# Patient Record
Sex: FEMALE | Race: WHITE | Employment: FULL TIME | URBAN - METROPOLITAN AREA
[De-identification: names, ages, dates, MRNs, and addresses within clinical notes are randomized per-mention and may not be internally consistent; named-entity substitution may affect disease eponyms.]

---

## 2024-08-30 ENCOUNTER — APPOINTMENT (OUTPATIENT)
Age: 49
End: 2024-08-30

## 2024-08-30 ENCOUNTER — HOSPITAL ENCOUNTER (EMERGENCY)
Age: 49
Discharge: HOME | End: 2024-08-30
Attending: EMERGENCY MEDICINE

## 2024-08-30 VITALS
DIASTOLIC BLOOD PRESSURE: 87 MMHG | RESPIRATION RATE: 17 BRPM | BODY MASS INDEX: 36.82 KG/M2 | TEMPERATURE: 97.8 F | OXYGEN SATURATION: 100 % | WEIGHT: 195 LBS | HEART RATE: 68 BPM | SYSTOLIC BLOOD PRESSURE: 128 MMHG | HEIGHT: 61 IN

## 2024-08-30 DIAGNOSIS — K76.89 LIVER CYST: ICD-10-CM

## 2024-08-30 DIAGNOSIS — R94.31 PROLONGED QT INTERVAL: ICD-10-CM

## 2024-08-30 DIAGNOSIS — K57.90 DIVERTICULOSIS: ICD-10-CM

## 2024-08-30 DIAGNOSIS — K52.9 ACUTE GASTROENTERITIS: Primary | ICD-10-CM

## 2024-08-30 LAB
ANION GAP SERPL CALCULATED.3IONS-SCNC: 16 MMOL/L (ref 3–16)
BASOPHILS # BLD: 0.02 K/UL (ref 0–0.2)
BASOPHILS NFR BLD: 0 %
BILIRUB UR QL STRIP: ABNORMAL
BUN SERPL-MCNC: 12 MG/DL (ref 7–20)
CALCIUM SERPL-MCNC: 10.4 MG/DL (ref 8.3–10.6)
CHARACTER UR: ABNORMAL
CHLORIDE SERPL-SCNC: 104 MMOL/L (ref 99–110)
CLARITY UR: CLEAR
CO2 SERPL-SCNC: 24 MMOL/L (ref 21–32)
COLOR UR: YELLOW
CREAT SERPL-MCNC: 0.7 MG/DL (ref 0.5–1)
EKG ATRIAL RATE: 73 BPM
EKG DIAGNOSIS: NORMAL
EKG P AXIS: 52 DEGREES
EKG P-R INTERVAL: 164 MS
EKG Q-T INTERVAL: 442 MS
EKG QRS DURATION: 72 MS
EKG QTC CALCULATION (BAZETT): 486 MS
EKG R AXIS: 49 DEGREES
EKG T AXIS: 27 DEGREES
EKG VENTRICULAR RATE: 73 BPM
EOSINOPHIL # BLD: 0.08 K/UL (ref 0–0.6)
EOSINOPHILS RELATIVE PERCENT: 1 %
EPI CELLS #/AREA URNS HPF: ABNORMAL /HPF
ERYTHROCYTE [DISTWIDTH] IN BLOOD BY AUTOMATED COUNT: 12.6 % (ref 12.4–15.4)
GFR, ESTIMATED: >90 ML/MIN/1.73M2
GLUCOSE SERPL-MCNC: 152 MG/DL (ref 70–99)
GLUCOSE UR STRIP-MCNC: NEGATIVE MG/DL
HCG UR QL: NEGATIVE
HCT VFR BLD AUTO: 48.4 % (ref 36–48)
HGB BLD-MCNC: 17.1 G/DL (ref 12–16)
HGB UR QL STRIP.AUTO: NEGATIVE
IMM GRANULOCYTES # BLD AUTO: 0.01 K/UL (ref 0–0.5)
IMM GRANULOCYTES NFR BLD: 0 %
KETONES UR STRIP-MCNC: >80 MG/DL
LEUKOCYTE ESTERASE UR QL STRIP: ABNORMAL
LIPASE SERPL-CCNC: 29 U/L (ref 13–60)
LYMPHOCYTES NFR BLD: 1.57 K/UL (ref 1–5.1)
LYMPHOCYTES RELATIVE PERCENT: 18 %
MCH RBC QN AUTO: 31.1 PG (ref 26–34)
MCHC RBC AUTO-ENTMCNC: 35.3 G/DL (ref 31–36)
MCV RBC AUTO: 88 FL (ref 80–100)
MONOCYTES NFR BLD: 0.42 K/UL (ref 0–1.3)
MONOCYTES NFR BLD: 5 %
NEUTROPHILS NFR BLD: 77 %
NEUTS SEG NFR BLD: 6.8 K/UL (ref 1.7–7.7)
NITRITE UR QL STRIP: NEGATIVE
PH UR STRIP: 6.5 [PH] (ref 5–8)
PLATELET # BLD AUTO: 298 K/UL (ref 135–450)
PMV BLD AUTO: 10.1 FL
POTASSIUM SERPL-SCNC: 4.5 MMOL/L (ref 3.5–5.1)
PROT UR STRIP-MCNC: 100 MG/DL
RBC # BLD AUTO: 5.5 M/UL (ref 4–5.2)
RBC #/AREA URNS HPF: ABNORMAL /HPF
SODIUM SERPL-SCNC: 144 MMOL/L (ref 136–145)
SP GR UR STRIP: 1.01 (ref 1–1.03)
UROBILINOGEN UR STRIP-ACNC: 0.2 EU/DL (ref 0–1)
WBC #/AREA URNS HPF: ABNORMAL /HPF
WBC OTHER # BLD: 8.9 K/UL (ref 4–11)

## 2024-08-30 PROCEDURE — 99285 EMERGENCY DEPT VISIT HI MDM: CPT

## 2024-08-30 PROCEDURE — 84703 CHORIONIC GONADOTROPIN ASSAY: CPT

## 2024-08-30 PROCEDURE — 6360000004 HC RX CONTRAST MEDICATION: Performed by: EMERGENCY MEDICINE

## 2024-08-30 PROCEDURE — 74177 CT ABD & PELVIS W/CONTRAST: CPT

## 2024-08-30 PROCEDURE — 83690 ASSAY OF LIPASE: CPT

## 2024-08-30 PROCEDURE — 96375 TX/PRO/DX INJ NEW DRUG ADDON: CPT

## 2024-08-30 PROCEDURE — 96374 THER/PROPH/DIAG INJ IV PUSH: CPT

## 2024-08-30 PROCEDURE — 80048 BASIC METABOLIC PNL TOTAL CA: CPT

## 2024-08-30 PROCEDURE — 81001 URINALYSIS AUTO W/SCOPE: CPT

## 2024-08-30 PROCEDURE — 85025 COMPLETE CBC W/AUTO DIFF WBC: CPT

## 2024-08-30 PROCEDURE — 2580000003 HC RX 258: Performed by: EMERGENCY MEDICINE

## 2024-08-30 PROCEDURE — 6360000002 HC RX W HCPCS: Performed by: EMERGENCY MEDICINE

## 2024-08-30 RX ORDER — PROMETHAZINE HYDROCHLORIDE 25 MG/1
25 TABLET ORAL 4 TIMES DAILY PRN
Qty: 20 TABLET | Refills: 0 | Status: SHIPPED | OUTPATIENT
Start: 2024-08-30 | End: 2024-09-06

## 2024-08-30 RX ORDER — IOPAMIDOL 755 MG/ML
75 INJECTION, SOLUTION INTRAVASCULAR
Status: COMPLETED | OUTPATIENT
Start: 2024-08-30 | End: 2024-08-30

## 2024-08-30 RX ORDER — DICYCLOMINE HCL 20 MG
20 TABLET ORAL 4 TIMES DAILY
Qty: 40 TABLET | Refills: 0 | Status: SHIPPED | OUTPATIENT
Start: 2024-08-30 | End: 2024-09-09

## 2024-08-30 RX ORDER — PROCHLORPERAZINE EDISYLATE 5 MG/ML
10 INJECTION INTRAMUSCULAR; INTRAVENOUS ONCE
Status: COMPLETED | OUTPATIENT
Start: 2024-08-30 | End: 2024-08-30

## 2024-08-30 RX ORDER — 0.9 % SODIUM CHLORIDE 0.9 %
1000 INTRAVENOUS SOLUTION INTRAVENOUS ONCE
Status: COMPLETED | OUTPATIENT
Start: 2024-08-30 | End: 2024-08-30

## 2024-08-30 RX ORDER — ONDANSETRON 2 MG/ML
4 INJECTION INTRAMUSCULAR; INTRAVENOUS ONCE
Status: COMPLETED | OUTPATIENT
Start: 2024-08-30 | End: 2024-08-30

## 2024-08-30 RX ORDER — MORPHINE SULFATE 4 MG/ML
4 INJECTION, SOLUTION INTRAMUSCULAR; INTRAVENOUS
Status: COMPLETED | OUTPATIENT
Start: 2024-08-30 | End: 2024-08-30

## 2024-08-30 RX ADMIN — ONDANSETRON 4 MG: 2 INJECTION INTRAMUSCULAR; INTRAVENOUS at 11:12

## 2024-08-30 RX ADMIN — SODIUM CHLORIDE 1000 ML: 9 INJECTION, SOLUTION INTRAVENOUS at 10:12

## 2024-08-30 RX ADMIN — MORPHINE SULFATE 4 MG: 4 INJECTION, SOLUTION INTRAMUSCULAR; INTRAVENOUS at 10:55

## 2024-08-30 RX ADMIN — PROCHLORPERAZINE EDISYLATE 10 MG: 5 INJECTION INTRAMUSCULAR; INTRAVENOUS at 10:13

## 2024-08-30 RX ADMIN — IOPAMIDOL 75 ML: 755 INJECTION, SOLUTION INTRAVENOUS at 11:25

## 2024-08-30 ASSESSMENT — PAIN DESCRIPTION - DESCRIPTORS
DESCRIPTORS: DULL
DESCRIPTORS: DULL

## 2024-08-30 ASSESSMENT — PAIN - FUNCTIONAL ASSESSMENT: PAIN_FUNCTIONAL_ASSESSMENT: 0-10

## 2024-08-30 ASSESSMENT — PAIN SCALES - GENERAL
PAINLEVEL_OUTOF10: 5
PAINLEVEL_OUTOF10: 7

## 2024-08-30 ASSESSMENT — PAIN DESCRIPTION - LOCATION
LOCATION: ABDOMEN
LOCATION: ABDOMEN

## 2024-08-30 NOTE — ED NOTES
Patient states she has\"slight\" abdominal pain but is rolling around in bed and unable to sit still and is moaning.

## 2024-08-30 NOTE — ED PROVIDER NOTES
[facility] EMERGENCY DEPT     EMERGENCY DEPARTMENT ENCOUNTER            Pt Name: Yamilka Veliz   MRN: [id number]   Birthdate 1975   Date of evaluation: 8/30/2024   Provider: Reece Maza MD   PCP: No primary care provider on file.   Note Started: 10:57 AM EDT 8/30/24          CHIEF COMPLAINT     Chief Complaint   Patient presents with    Emesis     Woke up at 0430 with nausea and vomiting. Slight lower abdominal pain.              HISTORY OF PRESENT ILLNESS:   History from : Patient   Limitations to history : None     Yamilka Veliz is a 49 y.o. female who presents to the emergency department with nausea vomiting and diarrhea beginning at 4:30 AM today.  She has had too numerous to count episodes of vomiting and states over the past few days she has actually had loose stool.  She denies fever, chills but does complain of malaise and fatigue.  She reports no bleeding of any sort, no dysuria, no chest pain, shortness of breath.  She is taken no temporizing measures to treat her condition.    Nursing Notes were all reviewed and agreed with, or any disagreements were addressed in the HPI.     REVIEW OF SYSTEMS :    Positives and Pertinent negatives as per HPI.      MEDICAL HISTORY   has a past medical history of Arthritis, Cancer (HCC), and Thyroid disease.    Past Surgical History:   Procedure Laterality Date    BUNIONECTOMY  12/06/2012    left    CARPAL TUNNEL RELEASE  2007    bilateral    OVARY REMOVAL        CURRENTMEDICATIONS       Previous Medications    CALCIUM CARBONATE (OSCAL) 500 MG TABS TABLET    Take 500 mg by mouth daily.      LEVOTHYROXINE (SYNTHROID) 200 MCG TABLET    Take 200 mcg by mouth daily.      MULTIVITAMIN (THERAGRAN) PER TABLET    Take 1 tablet by mouth daily.        SCREENINGS          Jackpot Coma Scale  Eye Opening: Spontaneous  Best Verbal Response: Oriented  Best Motor Response: Obeys commands  Shira Coma Scale Score: 15                CIWA Assessment  BP: (!)  IntraVENous Given 8/30/24 1125)        CONSULTS:   None   Discussion with Other Professionals: None   Social Determinants: None   Chronic Conditions:  None    Records Reviewed: Epic EMR      Disposition Considerations: Home self-care  I am the Primary Clinician of Record.        FINAL IMPRESSION    1. Acute gastroenteritis    2. Prolonged QT interval    3. Diverticulosis    4. Liver cyst           DISPOSITION/PLAN:     Clear liquid diet 1 to 2 days and advance diet as tolerated  Fill prescriptions and take as written  Contact primary care to establish a relationship and obtain follow-up  You must follow-up for the liver cyst discovered today.        Pt discharged home in stable condition.     PATIENT REFERRED TO:   Jenn Frederick MD  [address] [facility] [place] [address]  [phone]    Call in 1 day         DISCHARGE MEDICATIONS:   New Prescriptions    DICYCLOMINE (BENTYL) 20 MG TABLET    Take 1 tablet by mouth 4 times daily for 10 days    PROMETHAZINE (PHENERGAN) 25 MG TABLET    Take 1 tablet by mouth 4 times daily as needed for Nausea        DISCONTINUED MEDICATIONS:   Discontinued Medications    No medications on file              (Please note that portions of this note were completed with a voice recognition program.  Efforts were made to edit the dictations but occasionally words are mis-transcribed.)       Reece Maza MD (electronically signed)              Reece Maza MD  08/30/24 8417

## 2024-08-30 NOTE — DISCHARGE INSTRUCTIONS
Your testing today showed the presence of diverticulosis and a liver cyst.  Please get with your primary care provider as you need to have follow-up on both of these conditions especially the liver cyst as soon as possible.  Since she did not identify a primary care physician I am placing the name of a doctor located in the area you may contact to establish a relationship with for primary medical care.

## 2025-07-17 ENCOUNTER — APPOINTMENT (OUTPATIENT)
Age: 50
End: 2025-07-17
Payer: MEDICAID

## 2025-07-17 ENCOUNTER — HOSPITAL ENCOUNTER (INPATIENT)
Age: 50
LOS: 2 days | Discharge: HOME | End: 2025-07-19
Attending: EMERGENCY MEDICINE | Admitting: FAMILY MEDICINE
Payer: MEDICAID

## 2025-07-17 DIAGNOSIS — R10.84 GENERALIZED ABDOMINAL PAIN: ICD-10-CM

## 2025-07-17 DIAGNOSIS — K57.32 DIVERTICULITIS LARGE INTESTINE W/O PERFORATION OR ABSCESS W/O BLEEDING: ICD-10-CM

## 2025-07-17 DIAGNOSIS — R11.2 NAUSEA AND VOMITING, UNSPECIFIED VOMITING TYPE: Primary | ICD-10-CM

## 2025-07-17 DIAGNOSIS — N83.201 RIGHT OVARIAN CYST: ICD-10-CM

## 2025-07-17 DIAGNOSIS — Z85.43 HISTORY OF OVARIAN CANCER: ICD-10-CM

## 2025-07-17 DIAGNOSIS — R19.00 INTRAABDOMINAL MASS: ICD-10-CM

## 2025-07-17 DIAGNOSIS — K57.92 ACUTE DIVERTICULITIS: ICD-10-CM

## 2025-07-17 LAB
ALBUMIN SERPL-MCNC: 5.4 G/DL (ref 3.4–5)
ALBUMIN/GLOB SERPL: 1.6 {RATIO}
ALP SERPL-CCNC: 63 U/L (ref 40–129)
ALT SERPL-CCNC: 43 U/L (ref 10–40)
ANION GAP SERPL CALCULATED.3IONS-SCNC: 17 MMOL/L (ref 3–16)
AST SERPL-CCNC: 31 U/L (ref 15–37)
BACTERIA URNS QL MICRO: ABNORMAL
BASOPHILS # BLD: 0.02 K/UL (ref 0–0.2)
BASOPHILS NFR BLD: 0 %
BILIRUB SERPL-MCNC: 0.7 MG/DL (ref 0–1)
BILIRUB UR QL STRIP: NEGATIVE
BUN SERPL-MCNC: 11 MG/DL (ref 7–20)
CALCIUM SERPL-MCNC: 10.3 MG/DL (ref 8.3–10.6)
CHARACTER UR: ABNORMAL
CHLORIDE SERPL-SCNC: 101 MMOL/L (ref 99–110)
CLARITY UR: CLEAR
CO2 SERPL-SCNC: 22 MMOL/L (ref 21–32)
COLOR UR: YELLOW
CREAT SERPL-MCNC: 0.8 MG/DL (ref 0.5–1)
EOSINOPHIL # BLD: 0.14 K/UL (ref 0–0.6)
EOSINOPHILS RELATIVE PERCENT: 1 %
EPI CELLS #/AREA URNS HPF: ABNORMAL /HPF
ERYTHROCYTE [DISTWIDTH] IN BLOOD BY AUTOMATED COUNT: 13 % (ref 12.4–15.4)
GFR, ESTIMATED: 84 ML/MIN/1.73M2
GLUCOSE SERPL-MCNC: 123 MG/DL (ref 70–99)
GLUCOSE UR STRIP-MCNC: NEGATIVE MG/DL
HCG UR QL: NEGATIVE
HCT VFR BLD AUTO: 52.8 % (ref 36–48)
HGB BLD-MCNC: 17.8 G/DL (ref 12–16)
HGB UR QL STRIP.AUTO: ABNORMAL
IMM GRANULOCYTES # BLD AUTO: 0.04 K/UL (ref 0–0.5)
IMM GRANULOCYTES NFR BLD: 0 %
KETONES UR STRIP-MCNC: 40 MG/DL
LEUKOCYTE ESTERASE UR QL STRIP: NEGATIVE
LIPASE SERPL-CCNC: 45 U/L (ref 13–60)
LYMPHOCYTES NFR BLD: 1.77 K/UL (ref 1–5.1)
LYMPHOCYTES RELATIVE PERCENT: 15 %
MCH RBC QN AUTO: 29.4 PG (ref 26–34)
MCHC RBC AUTO-ENTMCNC: 33.7 G/DL (ref 31–36)
MCV RBC AUTO: 87.1 FL (ref 80–100)
MONOCYTES NFR BLD: 0.54 K/UL (ref 0–1.3)
MONOCYTES NFR BLD: 5 %
MUCOUS THREADS URNS QL MICRO: PRESENT
NEUTROPHILS NFR BLD: 78 %
NEUTS SEG NFR BLD: 9.09 K/UL (ref 1.7–7.7)
NITRITE UR QL STRIP: NEGATIVE
PH UR STRIP: 6 [PH] (ref 5–8)
PLATELET # BLD AUTO: 317 K/UL (ref 135–450)
PMV BLD AUTO: 10.4 FL
POTASSIUM SERPL-SCNC: 4.2 MMOL/L (ref 3.5–5.1)
PROT SERPL-MCNC: 8.8 G/DL (ref 6.4–8.2)
PROT UR STRIP-MCNC: 30 MG/DL
RBC # BLD AUTO: 6.06 M/UL (ref 4–5.2)
RBC #/AREA URNS HPF: ABNORMAL /HPF
SODIUM SERPL-SCNC: 140 MMOL/L (ref 136–145)
SP GR UR STRIP: 1.02 (ref 1–1.03)
UROBILINOGEN UR STRIP-ACNC: 0.2 EU/DL (ref 0–1)
WBC #/AREA URNS HPF: ABNORMAL /HPF
WBC OTHER # BLD: 11.6 K/UL (ref 4–11)

## 2025-07-17 PROCEDURE — 81001 URINALYSIS AUTO W/SCOPE: CPT

## 2025-07-17 PROCEDURE — 6360000002 HC RX W HCPCS: Performed by: EMERGENCY MEDICINE

## 2025-07-17 PROCEDURE — 96375 TX/PRO/DX INJ NEW DRUG ADDON: CPT

## 2025-07-17 PROCEDURE — 6360000004 HC RX CONTRAST MEDICATION: Performed by: EMERGENCY MEDICINE

## 2025-07-17 PROCEDURE — 99285 EMERGENCY DEPT VISIT HI MDM: CPT

## 2025-07-17 PROCEDURE — 2580000003 HC RX 258: Performed by: FAMILY MEDICINE

## 2025-07-17 PROCEDURE — 96376 TX/PRO/DX INJ SAME DRUG ADON: CPT

## 2025-07-17 PROCEDURE — 96374 THER/PROPH/DIAG INJ IV PUSH: CPT

## 2025-07-17 PROCEDURE — 80053 COMPREHEN METABOLIC PANEL: CPT

## 2025-07-17 PROCEDURE — 84703 CHORIONIC GONADOTROPIN ASSAY: CPT

## 2025-07-17 PROCEDURE — 74177 CT ABD & PELVIS W/CONTRAST: CPT

## 2025-07-17 PROCEDURE — 83690 ASSAY OF LIPASE: CPT

## 2025-07-17 PROCEDURE — 6360000002 HC RX W HCPCS: Performed by: FAMILY MEDICINE

## 2025-07-17 PROCEDURE — 85025 COMPLETE CBC W/AUTO DIFF WBC: CPT

## 2025-07-17 PROCEDURE — 1200000000 HC SEMI PRIVATE

## 2025-07-17 PROCEDURE — 2580000003 HC RX 258: Performed by: EMERGENCY MEDICINE

## 2025-07-17 RX ORDER — 0.9 % SODIUM CHLORIDE 0.9 %
500 INTRAVENOUS SOLUTION INTRAVENOUS ONCE
Status: COMPLETED | OUTPATIENT
Start: 2025-07-17 | End: 2025-07-17

## 2025-07-17 RX ORDER — POLYETHYLENE GLYCOL 3350 17 G/17G
17 POWDER, FOR SOLUTION ORAL DAILY PRN
Status: DISCONTINUED | OUTPATIENT
Start: 2025-07-17 | End: 2025-07-19 | Stop reason: HOSPADM

## 2025-07-17 RX ORDER — MAGNESIUM SULFATE HEPTAHYDRATE 40 MG/ML
2000 INJECTION, SOLUTION INTRAVENOUS PRN
Status: DISCONTINUED | OUTPATIENT
Start: 2025-07-17 | End: 2025-07-19 | Stop reason: HOSPADM

## 2025-07-17 RX ORDER — ACETAMINOPHEN 650 MG/1
650 SUPPOSITORY RECTAL EVERY 6 HOURS PRN
Status: DISCONTINUED | OUTPATIENT
Start: 2025-07-17 | End: 2025-07-19 | Stop reason: HOSPADM

## 2025-07-17 RX ORDER — MORPHINE SULFATE 4 MG/ML
4 INJECTION, SOLUTION INTRAMUSCULAR; INTRAVENOUS ONCE
Status: COMPLETED | OUTPATIENT
Start: 2025-07-17 | End: 2025-07-17

## 2025-07-17 RX ORDER — LEVOTHYROXINE SODIUM 100 UG/1
200 TABLET ORAL DAILY
Status: DISCONTINUED | OUTPATIENT
Start: 2025-07-18 | End: 2025-07-17 | Stop reason: DRUGHIGH

## 2025-07-17 RX ORDER — POTASSIUM CHLORIDE 1500 MG/1
40 TABLET, EXTENDED RELEASE ORAL PRN
Status: DISCONTINUED | OUTPATIENT
Start: 2025-07-17 | End: 2025-07-19 | Stop reason: HOSPADM

## 2025-07-17 RX ORDER — SODIUM CHLORIDE 9 MG/ML
INJECTION, SOLUTION INTRAVENOUS PRN
Status: DISCONTINUED | OUTPATIENT
Start: 2025-07-17 | End: 2025-07-19 | Stop reason: HOSPADM

## 2025-07-17 RX ORDER — METRONIDAZOLE 500 MG/100ML
500 INJECTION, SOLUTION INTRAVENOUS EVERY 8 HOURS
Status: DISCONTINUED | OUTPATIENT
Start: 2025-07-18 | End: 2025-07-19 | Stop reason: HOSPADM

## 2025-07-17 RX ORDER — IOPAMIDOL 755 MG/ML
75 INJECTION, SOLUTION INTRAVASCULAR
Status: COMPLETED | OUTPATIENT
Start: 2025-07-17 | End: 2025-07-17

## 2025-07-17 RX ORDER — POTASSIUM CHLORIDE 7.45 MG/ML
10 INJECTION INTRAVENOUS PRN
Status: DISCONTINUED | OUTPATIENT
Start: 2025-07-17 | End: 2025-07-19 | Stop reason: HOSPADM

## 2025-07-17 RX ORDER — SODIUM CHLORIDE 0.9 % (FLUSH) 0.9 %
5-40 SYRINGE (ML) INJECTION EVERY 12 HOURS SCHEDULED
Status: DISCONTINUED | OUTPATIENT
Start: 2025-07-17 | End: 2025-07-19 | Stop reason: HOSPADM

## 2025-07-17 RX ORDER — LEVOFLOXACIN 5 MG/ML
500 INJECTION, SOLUTION INTRAVENOUS ONCE
Status: COMPLETED | OUTPATIENT
Start: 2025-07-17 | End: 2025-07-17

## 2025-07-17 RX ORDER — ACETAMINOPHEN 325 MG/1
650 TABLET ORAL EVERY 6 HOURS PRN
Status: DISCONTINUED | OUTPATIENT
Start: 2025-07-17 | End: 2025-07-19 | Stop reason: HOSPADM

## 2025-07-17 RX ORDER — SODIUM CHLORIDE 9 MG/ML
INJECTION, SOLUTION INTRAVENOUS CONTINUOUS
Status: ACTIVE | OUTPATIENT
Start: 2025-07-17 | End: 2025-07-18

## 2025-07-17 RX ORDER — DIPHENHYDRAMINE HYDROCHLORIDE 50 MG/ML
25 INJECTION, SOLUTION INTRAMUSCULAR; INTRAVENOUS ONCE
Status: COMPLETED | OUTPATIENT
Start: 2025-07-17 | End: 2025-07-17

## 2025-07-17 RX ORDER — METRONIDAZOLE 500 MG/100ML
500 INJECTION, SOLUTION INTRAVENOUS ONCE
Status: COMPLETED | OUTPATIENT
Start: 2025-07-17 | End: 2025-07-17

## 2025-07-17 RX ORDER — SODIUM CHLORIDE 0.9 % (FLUSH) 0.9 %
5-40 SYRINGE (ML) INJECTION PRN
Status: DISCONTINUED | OUTPATIENT
Start: 2025-07-17 | End: 2025-07-19 | Stop reason: HOSPADM

## 2025-07-17 RX ORDER — ONDANSETRON 2 MG/ML
4 INJECTION INTRAMUSCULAR; INTRAVENOUS ONCE
Status: COMPLETED | OUTPATIENT
Start: 2025-07-17 | End: 2025-07-17

## 2025-07-17 RX ORDER — 0.9 % SODIUM CHLORIDE 0.9 %
1000 INTRAVENOUS SOLUTION INTRAVENOUS ONCE
Status: COMPLETED | OUTPATIENT
Start: 2025-07-17 | End: 2025-07-17

## 2025-07-17 RX ORDER — METOCLOPRAMIDE HYDROCHLORIDE 5 MG/ML
5 INJECTION INTRAMUSCULAR; INTRAVENOUS ONCE
Status: COMPLETED | OUTPATIENT
Start: 2025-07-17 | End: 2025-07-17

## 2025-07-17 RX ORDER — KETOROLAC TROMETHAMINE 15 MG/ML
15 INJECTION, SOLUTION INTRAMUSCULAR; INTRAVENOUS ONCE
Status: COMPLETED | OUTPATIENT
Start: 2025-07-17 | End: 2025-07-17

## 2025-07-17 RX ORDER — PANTOPRAZOLE SODIUM 40 MG/10ML
40 INJECTION, POWDER, LYOPHILIZED, FOR SOLUTION INTRAVENOUS ONCE
Status: COMPLETED | OUTPATIENT
Start: 2025-07-17 | End: 2025-07-17

## 2025-07-17 RX ORDER — ONDANSETRON 2 MG/ML
4 INJECTION INTRAMUSCULAR; INTRAVENOUS EVERY 6 HOURS PRN
Status: DISCONTINUED | OUTPATIENT
Start: 2025-07-17 | End: 2025-07-19 | Stop reason: HOSPADM

## 2025-07-17 RX ORDER — ENOXAPARIN SODIUM 100 MG/ML
40 INJECTION SUBCUTANEOUS DAILY
Status: DISCONTINUED | OUTPATIENT
Start: 2025-07-17 | End: 2025-07-19 | Stop reason: HOSPADM

## 2025-07-17 RX ORDER — ONDANSETRON 4 MG/1
4 TABLET, ORALLY DISINTEGRATING ORAL EVERY 8 HOURS PRN
Status: DISCONTINUED | OUTPATIENT
Start: 2025-07-17 | End: 2025-07-19 | Stop reason: HOSPADM

## 2025-07-17 RX ORDER — LEVOFLOXACIN 5 MG/ML
750 INJECTION, SOLUTION INTRAVENOUS EVERY 24 HOURS
Status: DISCONTINUED | OUTPATIENT
Start: 2025-07-18 | End: 2025-07-19 | Stop reason: HOSPADM

## 2025-07-17 RX ADMIN — ONDANSETRON 4 MG: 2 INJECTION, SOLUTION INTRAMUSCULAR; INTRAVENOUS at 10:33

## 2025-07-17 RX ADMIN — ENOXAPARIN SODIUM 40 MG: 100 INJECTION SUBCUTANEOUS at 17:00

## 2025-07-17 RX ADMIN — PANTOPRAZOLE SODIUM 40 MG: 40 INJECTION, POWDER, FOR SOLUTION INTRAVENOUS at 12:23

## 2025-07-17 RX ADMIN — IOPAMIDOL 75 ML: 755 INJECTION, SOLUTION INTRAVENOUS at 12:38

## 2025-07-17 RX ADMIN — KETOROLAC TROMETHAMINE 15 MG: 15 INJECTION, SOLUTION INTRAMUSCULAR; INTRAVENOUS at 10:34

## 2025-07-17 RX ADMIN — METRONIDAZOLE 500 MG: 500 INJECTION, SOLUTION INTRAVENOUS at 16:58

## 2025-07-17 RX ADMIN — ONDANSETRON 4 MG: 2 INJECTION, SOLUTION INTRAMUSCULAR; INTRAVENOUS at 12:23

## 2025-07-17 RX ADMIN — SODIUM CHLORIDE 1000 ML: 0.9 INJECTION, SOLUTION INTRAVENOUS at 15:18

## 2025-07-17 RX ADMIN — LEVOFLOXACIN 500 MG: 500 INJECTION, SOLUTION INTRAVENOUS at 15:19

## 2025-07-17 RX ADMIN — METOCLOPRAMIDE 5 MG: 5 INJECTION, SOLUTION INTRAMUSCULAR; INTRAVENOUS at 10:35

## 2025-07-17 RX ADMIN — MORPHINE SULFATE 4 MG: 4 INJECTION, SOLUTION INTRAMUSCULAR; INTRAVENOUS at 12:22

## 2025-07-17 RX ADMIN — SODIUM CHLORIDE: 0.9 INJECTION, SOLUTION INTRAVENOUS at 16:58

## 2025-07-17 RX ADMIN — DIPHENHYDRAMINE HYDROCHLORIDE 25 MG: 50 INJECTION INTRAMUSCULAR; INTRAVENOUS at 10:34

## 2025-07-17 RX ADMIN — SODIUM CHLORIDE 500 ML: 0.9 INJECTION, SOLUTION INTRAVENOUS at 10:33

## 2025-07-17 ASSESSMENT — LIFESTYLE VARIABLES
HOW OFTEN DO YOU HAVE A DRINK CONTAINING ALCOHOL: NEVER
HOW MANY STANDARD DRINKS CONTAINING ALCOHOL DO YOU HAVE ON A TYPICAL DAY: PATIENT DOES NOT DRINK

## 2025-07-17 ASSESSMENT — PAIN SCALES - GENERAL
PAINLEVEL_OUTOF10: 7
PAINLEVEL_OUTOF10: 5
PAINLEVEL_OUTOF10: 0
PAINLEVEL_OUTOF10: 0
PAINLEVEL_OUTOF10: 6

## 2025-07-17 ASSESSMENT — PAIN - FUNCTIONAL ASSESSMENT: PAIN_FUNCTIONAL_ASSESSMENT: NONE - DENIES PAIN

## 2025-07-17 NOTE — ED NOTES
ED to Inpatient Handoff SBAR    Patient Name: Yamilka Veliz   :  1975  50 y.o.   MRN:  [id number]  Preferred Name  Yamilka  ED Room #:  10/10  Family/Caregiver Present yes     Chief Complaint Vomiting (X 3 hrs)       Restraints no   Sitter no   Sepsis Risk Score    Isolation No active isolations   Fall Risk Assessment Presents to emergency department  because of falls (Syncope, seizure, or loss of consciousness): No, Age > 70: No, Altered Mental Status, Intoxication with alcohol or substance confusion (Disorientation, impaired judgment, poor safety awaremess, or inability to follow instructions): No, Impaired Mobility: Ambulates or transfers with assistive devices or assistance; Unable to ambulate or transer.: No, Nursing Judgement: No     Situation  Code Status: No Order Limited Code details: Intubation/Re-intubation No Comment; Defibrillation/Cardioversion No Comment; Chest Compressions No Comment; Resuscitative Medications No Comment; Other No Comment.    Allergies: Cefdinir  Weight: Patient Vitals for the past 96 hrs (Last 3 readings):   Weight   25 1003 78.2 kg (172 lb 4.8 oz)     Arrived from: home  Hospital Problem/Diagnosis:  Principal Problem:    Diverticulitis large intestine w/o perforation or abscess w/o bleeding  Resolved Problems:    * No resolved hospital problems. *    Imaging:   CT ABDOMEN PELVIS W IV CONTRAST Additional Contrast? None   Final Result      Moderate colonic diverticulosis, with mild fat stranding along the transverse colon suspicious for acute uncomplicated diverticulitis.      3.5 x 2.0 cm anterior intraperitoneal mass, increased. This is indeterminate, however given history of ovarian malignancy, metastasis is not excluded. Recommend tissue sampling.      Previously described indeterminant hepatic low-density lesion is compatible with a benign hemangioma.      4 cm right adnexal region low-density lesion, likely ovarian cyst. Given size and history of ovarian malignancy,

## 2025-07-17 NOTE — PLAN OF CARE
Problem: Discharge Planning  Goal: Discharge to home or other facility with appropriate resources  Outcome: Progressing  Flowsheets  Taken 7/17/2025 1600  Discharge to home or other facility with appropriate resources:   Identify discharge learning needs (meds, wound care, etc)   Arrange for needed discharge resources and transportation as appropriate   Identify barriers to discharge with patient and caregiver  Taken 7/17/2025 1555  Discharge to home or other facility with appropriate resources:   Identify barriers to discharge with patient and caregiver   Arrange for needed discharge resources and transportation as appropriate   Identify discharge learning needs (meds, wound care, etc)     Problem: Pain  Goal: Verbalizes/displays adequate comfort level or baseline comfort level  Outcome: Progressing  Flowsheets (Taken 7/17/2025 1600)  Verbalizes/displays adequate comfort level or baseline comfort level:   Encourage patient to monitor pain and request assistance   Administer analgesics based on type and severity of pain and evaluate response   Implement non-pharmacological measures as appropriate and evaluate response   Assess pain using appropriate pain scale     Problem: Safety - Adult  Goal: Free from fall injury  Outcome: Progressing

## 2025-07-17 NOTE — PROGRESS NOTES
4 Eyes Skin Assessment     NAME:  Yamilka Veliz  YOB: 1975  MEDICAL RECORD NUMBER:  [id number]    The patient is being assessed for  Admission    I agree that at least one RN has performed a thorough Head to Toe Skin Assessment on the patient. ALL assessment sites listed below have been assessed.      Areas assessed by both nurses:    Head, Face, Ears, Shoulders, Back, Chest, Arms, Elbows, Hands, Sacrum. Buttock, Coccyx, Ischium, Legs. Feet and Heels, and Under Medical Devices         Does the Patient have a Wound? No noted wound(s)       Sergio Prevention initiated by RN: No  Wound Care Orders initiated by RN: No    For hospital-acquired stage 1 & 2 and ALL Stage 3,4, Unstageable, DTI, NWPT, and Complex wounds: place order “IP Wound Care/Ostomy Nurse Eval and Treat” by RN under : No    New Ostomies, if present place, Ostomy referral order under : No     Nurse 1 eSignature: Electronically signed by Jyothi Charlton RN on 7/17/25 at 4:30 PM EDT    **SHARE this note so that the co-signing nurse can place an eSignature**    Nurse 2 eSignature: Electronically signed by Kaylee Vallecillo RN on 7/17/25 at 4:31 PM EDT

## 2025-07-17 NOTE — H&P
V2.0  History and Physical      Name:  Yamilka Veliz /Age/Sex: 1975  (50 y.o. female)   MRN & CSN:  [id number] & [id number] Encounter Date/Time: 2025 3:32 PM EDT   Location:  10/10 PCP: Veto Maravilla MD       Hospital Day: 1    Assessment and Plan:   Yamilka Veliz is a 50 y.o. female with a pmh of hypothyroid who presents with Diverticulitis large intestine w/o perforation or abscess w/o bleeding    Hospital Problems           Last Modified POA    * (Principal) Diverticulitis large intestine w/o perforation or abscess w/o bleeding 2025 Yes       Plan:  Diverticulitis   Presented with abdominal pain, nausea and vomiting  CT scan shows moderate colonic diverticulosis with mild fat stranding along the transverse colon suspicious for acute uncomplicated diverticulitis  3.5 x 2.0 anterior intraperitoneal mass increased in size  Hepatic lesion compatible with benign hemangioma  4 cm right adnexal region low-density lesion likely ovarian cyst  Follow-up with pelvic ultrasound in 6 to 12 weeks  GI consult further recommendations  Levaquin 750 mg IV daily  Flagyl 500 mg every 8 hours  Pain control  IV fluid resuscitation      2.  Hypothyroidism  Synthroid 200 mcg daily        Will continue to follow, monitor and manage chronic medical conditions with medication listed below    Disposition:   Current Living situation: Home  Expected Disposition: Home   Estimated D/C: 1-2 days    Diet Diet NPO   DVT Prophylaxis [x] Lovenox, []  Heparin, [] SCDs, [x] Ambulation,  [] Eliquis, [] Xarelto, [] Coumadin   Code Status No Order   Surrogate Decision Maker/ POA self     Personally reviewed Lab Studies and Imaging           History from:     patient, spouse, ED physician, medical rec    History of Present Illness:     Chief Complaint: Abdominal pain, nausea and vomiting  Yamilka Veliz is a 50 y.o. female with pmh of hypothyroidism, ovarian cancer who presents with abdominal pain, nausea and vomiting     is at the

## 2025-07-17 NOTE — ED PROVIDER NOTES
Emergency Department Encounter    Patient: Yamilka Veliz  MRN: [id number]  : 1975  Date of Evaluation: 2025  ED Provider:  Roma James DO    Triage Chief Complaint:   Vomiting (X 3 hrs)    [place]:  Yamilka Veliz is a 50 y.o. female with history of thyroid disease, ovarian cancer, arthritis that presents to the emergency department complaining of abdominal pain nausea vomiting.  Patient states symptoms started this morning.  She states she has tried to take some morning medication but vomited.  Patient states she does have gluten allergy but unsure if she had any gluten.  She ate a pot roast last evening.  No other sick contacts.  Patient states no dysuria hematuria no fevers chills.  States intermittent cough.  Patient states she has had her left ovary removed in the past for ovarian cancer.  Patient here for evaluation    ROS - see HPI, below listed is current ROS at time of my eval:  10 systems reviewed and negative except as above.     Past Medical History:   Diagnosis Date    Arthritis     Cancer (HCC)     OVARIAN- LEFT    Thyroid disease      Past Surgical History:   Procedure Laterality Date    BUNIONECTOMY  2012    left    CARPAL TUNNEL RELEASE  2007    bilateral    OVARY REMOVAL       Family History   Problem Relation Age of Onset    Diabetes Mother     High Cholesterol Mother     High Blood Pressure Mother     Cancer Mother     Diabetes Father     High Cholesterol Father     High Blood Pressure Father      Social History     Socioeconomic History    Marital status:      Spouse name: Not on file    Number of children: Not on file    Years of education: Not on file    Highest education level: Not on file   Occupational History    Not on file   Tobacco Use    Smoking status: Never    Smokeless tobacco: Not on file   Substance and Sexual Activity    Alcohol use: Yes     Alcohol/week: 3.0 standard drinks of alcohol     Types: 3 Cans of beer per week    Drug use: No    Sexual

## 2025-07-17 NOTE — PROGRESS NOTES
Patient was admitted to the 4th floor room 4219. Vital signs and assessment are stable at the time of arrival. Patient was oriented to her room and call light. Skin check performed by two nurses. No complications at the time of arrival.

## 2025-07-17 NOTE — PROGRESS NOTES
4 Eyes Skin Assessment     NAME:  Yamilka Veliz  YOB: 1975  MEDICAL RECORD NUMBER:  [id number]    The patient is being assessed for  Admission    I agree that at least one RN has performed a thorough Head to Toe Skin Assessment on the patient. ALL assessment sites listed below have been assessed.      Areas assessed by both nurses:    Head, Face, Ears, Shoulders, Back, Chest, and Arms, Elbows, Hands        Does the Patient have a Wound? No noted wound(s)       Sergio Prevention initiated by RN: No  Wound Care Orders initiated by RN: No    For hospital-acquired stage 1 & 2 and ALL Stage 3,4, Unstageable, DTI, NWPT, and Complex wounds: place order “IP Wound Care/Ostomy Nurse Eval and Treat” by RN under : No    New Ostomies, if present place, Ostomy referral order under : No     Nurse 1 eSignature: Electronically signed by Jyothi Charlton RN on 7/17/25 at 4:28 PM EDT    **SHARE this note so that the co-signing nurse can place an eSignature**    Nurse 2 eSignature: Electronically signed by Jyothi Charlton RN on 7/17/25 at 4:29 PM EDT

## 2025-07-18 PROBLEM — R19.00 INTRAABDOMINAL MASS: Status: ACTIVE | Noted: 2025-07-18

## 2025-07-18 PROBLEM — Z85.43 HISTORY OF OVARIAN CANCER: Status: ACTIVE | Noted: 2025-07-18

## 2025-07-18 PROBLEM — N83.201 RIGHT OVARIAN CYST: Status: ACTIVE | Noted: 2025-07-18

## 2025-07-18 LAB
ALBUMIN SERPL-MCNC: 4 G/DL (ref 3.4–5)
ALBUMIN/GLOB SERPL: 1.9 {RATIO}
ALP SERPL-CCNC: 43 U/L (ref 40–129)
ALT SERPL-CCNC: 25 U/L (ref 10–40)
ANION GAP SERPL CALCULATED.3IONS-SCNC: 11 MMOL/L (ref 3–16)
AST SERPL-CCNC: 18 U/L (ref 15–37)
BASOPHILS # BLD: 0.01 K/UL (ref 0–0.2)
BASOPHILS NFR BLD: 0 %
BILIRUB SERPL-MCNC: 0.5 MG/DL (ref 0–1)
BUN SERPL-MCNC: 7 MG/DL (ref 7–20)
CALCIUM SERPL-MCNC: 8.6 MG/DL (ref 8.3–10.6)
CHLORIDE SERPL-SCNC: 106 MMOL/L (ref 99–110)
CO2 SERPL-SCNC: 20 MMOL/L (ref 21–32)
CREAT SERPL-MCNC: 0.6 MG/DL (ref 0.5–1)
EOSINOPHIL # BLD: 0.23 K/UL (ref 0–0.6)
EOSINOPHILS RELATIVE PERCENT: 2 %
ERYTHROCYTE [DISTWIDTH] IN BLOOD BY AUTOMATED COUNT: 13 % (ref 12.4–15.4)
GFR, ESTIMATED: >90 ML/MIN/1.73M2
GLUCOSE SERPL-MCNC: 96 MG/DL (ref 70–99)
HCT VFR BLD AUTO: 42.2 % (ref 36–48)
HGB BLD-MCNC: 14 G/DL (ref 12–16)
IMM GRANULOCYTES # BLD AUTO: 0.02 K/UL (ref 0–0.5)
IMM GRANULOCYTES NFR BLD: 0 %
LYMPHOCYTES NFR BLD: 3.05 K/UL (ref 1–5.1)
LYMPHOCYTES RELATIVE PERCENT: 29 %
MCH RBC QN AUTO: 29.6 PG (ref 26–34)
MCHC RBC AUTO-ENTMCNC: 33.2 G/DL (ref 31–36)
MCV RBC AUTO: 89.2 FL (ref 80–100)
MONOCYTES NFR BLD: 0.77 K/UL (ref 0–1.3)
MONOCYTES NFR BLD: 7 %
NEUTROPHILS NFR BLD: 61 %
NEUTS SEG NFR BLD: 6.44 K/UL (ref 1.7–7.7)
PLATELET # BLD AUTO: 212 K/UL (ref 135–450)
PMV BLD AUTO: 10.4 FL
POTASSIUM SERPL-SCNC: 3.8 MMOL/L (ref 3.5–5.1)
PROT SERPL-MCNC: 6.1 G/DL (ref 6.4–8.2)
RBC # BLD AUTO: 4.73 M/UL (ref 4–5.2)
SODIUM SERPL-SCNC: 137 MMOL/L (ref 136–145)
WBC OTHER # BLD: 10.5 K/UL (ref 4–11)

## 2025-07-18 PROCEDURE — 2580000003 HC RX 258: Performed by: FAMILY MEDICINE

## 2025-07-18 PROCEDURE — 80053 COMPREHEN METABOLIC PANEL: CPT

## 2025-07-18 PROCEDURE — 36415 COLL VENOUS BLD VENIPUNCTURE: CPT

## 2025-07-18 PROCEDURE — 1200000000 HC SEMI PRIVATE

## 2025-07-18 PROCEDURE — 6370000000 HC RX 637 (ALT 250 FOR IP): Performed by: FAMILY MEDICINE

## 2025-07-18 PROCEDURE — 6360000002 HC RX W HCPCS: Performed by: FAMILY MEDICINE

## 2025-07-18 PROCEDURE — 85025 COMPLETE CBC W/AUTO DIFF WBC: CPT

## 2025-07-18 PROCEDURE — 86304 IMMUNOASSAY TUMOR CA 125: CPT

## 2025-07-18 RX ADMIN — HYDROMORPHONE HYDROCHLORIDE 0.5 MG: 1 INJECTION, SOLUTION INTRAMUSCULAR; INTRAVENOUS; SUBCUTANEOUS at 01:12

## 2025-07-18 RX ADMIN — METRONIDAZOLE 500 MG: 500 INJECTION, SOLUTION INTRAVENOUS at 23:11

## 2025-07-18 RX ADMIN — HYDROMORPHONE HYDROCHLORIDE 0.5 MG: 1 INJECTION, SOLUTION INTRAMUSCULAR; INTRAVENOUS; SUBCUTANEOUS at 23:13

## 2025-07-18 RX ADMIN — METRONIDAZOLE 500 MG: 500 INJECTION, SOLUTION INTRAVENOUS at 17:11

## 2025-07-18 RX ADMIN — SODIUM CHLORIDE: 0.9 INJECTION, SOLUTION INTRAVENOUS at 06:37

## 2025-07-18 RX ADMIN — LEVOTHYROXINE SODIUM 175 MCG: 0.12 TABLET ORAL at 05:59

## 2025-07-18 RX ADMIN — ACETAMINOPHEN 650 MG: 325 TABLET ORAL at 06:04

## 2025-07-18 RX ADMIN — SODIUM CHLORIDE: 0.9 INJECTION, SOLUTION INTRAVENOUS at 01:10

## 2025-07-18 RX ADMIN — ACETAMINOPHEN 650 MG: 325 TABLET ORAL at 19:41

## 2025-07-18 RX ADMIN — ENOXAPARIN SODIUM 40 MG: 100 INJECTION SUBCUTANEOUS at 15:19

## 2025-07-18 RX ADMIN — LEVOFLOXACIN 750 MG: 5 INJECTION, SOLUTION INTRAVENOUS at 15:19

## 2025-07-18 RX ADMIN — METRONIDAZOLE 500 MG: 500 INJECTION, SOLUTION INTRAVENOUS at 01:12

## 2025-07-18 RX ADMIN — METRONIDAZOLE 500 MG: 500 INJECTION, SOLUTION INTRAVENOUS at 09:19

## 2025-07-18 ASSESSMENT — PAIN DESCRIPTION - DESCRIPTORS
DESCRIPTORS: ACHING;HEAVINESS
DESCRIPTORS: CRAMPING;ACHING
DESCRIPTORS: ACHING
DESCRIPTORS: ACHING

## 2025-07-18 ASSESSMENT — PAIN DESCRIPTION - LOCATION
LOCATION: GENERALIZED
LOCATION: HEAD
LOCATION: ABDOMEN
LOCATION: HEAD

## 2025-07-18 ASSESSMENT — PAIN SCALES - GENERAL
PAINLEVEL_OUTOF10: 5
PAINLEVEL_OUTOF10: 6
PAINLEVEL_OUTOF10: 7
PAINLEVEL_OUTOF10: 0
PAINLEVEL_OUTOF10: 4
PAINLEVEL_OUTOF10: 2
PAINLEVEL_OUTOF10: 5
PAINLEVEL_OUTOF10: 3
PAINLEVEL_OUTOF10: 6

## 2025-07-18 ASSESSMENT — PAIN DESCRIPTION - FREQUENCY
FREQUENCY: INTERMITTENT
FREQUENCY: CONTINUOUS

## 2025-07-18 ASSESSMENT — PAIN - FUNCTIONAL ASSESSMENT
PAIN_FUNCTIONAL_ASSESSMENT: ACTIVITIES ARE NOT PREVENTED
PAIN_FUNCTIONAL_ASSESSMENT: PREVENTS OR INTERFERES SOME ACTIVE ACTIVITIES AND ADLS

## 2025-07-18 ASSESSMENT — PAIN DESCRIPTION - ORIENTATION
ORIENTATION: RIGHT;LEFT
ORIENTATION: RIGHT;LEFT
ORIENTATION: MID
ORIENTATION: MID

## 2025-07-18 ASSESSMENT — PAIN SCALES - WONG BAKER
WONGBAKER_NUMERICALRESPONSE: NO HURT

## 2025-07-18 ASSESSMENT — PAIN DESCRIPTION - ONSET
ONSET: GRADUAL
ONSET: ON-GOING

## 2025-07-18 ASSESSMENT — PAIN DESCRIPTION - PAIN TYPE
TYPE: ACUTE PAIN
TYPE: ACUTE PAIN

## 2025-07-18 NOTE — CONSULTS
Consultation Note    Patient Name: Yamilka Veliz  : 1975  Age: 50 y.o.     Admitting Physician: Marcellus Smallwood MD   Date of Admission: 2025  9:57 AM   Primary Care Physician: Veto Maravilla MD        Yamilka Veliz is being seen at the request of Marcellus Smallwood MD for diverticulitis.    History of Present Illness:  Patient states that she has had vomiting for about 3 to 4 days then she started having diarrhea and so she came into the hospital.  There was fever and chills pain in the mid abdomen as well as in the right lower quadrant.  She denies any blood in the stool or black or tarry stool no blood in her emesis.  Her weight has been stable.  She states she has a history of gluten intolerance and was going to have an upper and lower endoscopy performed next week.    GI History:  As above    Past Medical History:  Past Medical History:   Diagnosis Date    Arthritis     Cancer (HCC)     OVARIAN- LEFT    Thyroid disease         Past Surgical History:  Past Surgical History:   Procedure Laterality Date    BUNIONECTOMY  2012    left    CARPAL TUNNEL RELEASE      bilateral    OVARY REMOVAL          Historical Medications:  Prior to Visit Medications    Medication Sig Taking? Authorizing Provider   levothyroxine (SYNTHROID) 200 MCG tablet Take 1 tablet by mouth daily Yes Fadumo Matson MD   dicyclomine (BENTYL) 20 MG tablet Take 1 tablet by mouth 4 times daily for 10 days  Reece Maza MD   calcium carbonate (OSCAL) 500 MG TABS tablet Take 500 mg by mouth daily.    Patient not taking: Reported on 2025  Fadumo Matson MD   multivitamin (THERAGRAN) per tablet Take 1 tablet by mouth daily.    Patient not taking: Reported on 2025  Fadumo Matson MD        Hospital Medications:  Current Facility-Administered Medications: sodium chloride flush 0.9 % injection 5-40 mL, 5-40 mL, IntraVENous, 2 times per day  sodium chloride flush 0.9 % injection 5-40 mL, 5-40 mL,

## 2025-07-18 NOTE — PLAN OF CARE
Problem: Discharge Planning  Goal: Discharge to home or other facility with appropriate resources  7/18/2025 0014 by Janey Navas, RN  Outcome: Progressing  Flowsheets  Taken 7/18/2025 0007  Discharge to home or other facility with appropriate resources: Identify barriers to discharge with patient and caregiver  Taken 7/17/2025 2000  Discharge to home or other facility with appropriate resources: Identify barriers to discharge with patient and caregiver     Problem: Pain  Goal: Verbalizes/displays adequate comfort level or baseline comfort level  7/18/2025 0014 by Janey Navas, RN  Outcome: Progressing  Flowsheets (Taken 7/17/2025 1944)  Verbalizes/displays adequate comfort level or baseline comfort level: Assess pain using appropriate pain scale   Encourage patient to monitor pain and request assistance   Administer analgesics based on type and severity of pain and evaluate response   Implement non-pharmacological measures as appropriate and evaluate response   Assess pain using appropriate pain scale     Problem: Safety - Adult  Goal: Free from fall injury  7/18/2025 0014 by Janey Navas, RN  Outcome: Progressing  Flowsheets (Taken 7/17/2025 2000)  Free From Fall Injury: Instruct family/caregiver on patient safety

## 2025-07-18 NOTE — CARE COORDINATION
Discharge Planning Note:    Chart reviewed and it appears that patient has minimal needs for discharge at this time. Risk Score 5 %     Primary Care Physician is PRISCA HOROWITZ   Primary insurance is     [employer] [place]       Please notify case management if any discharge needs are identified.      Case management will continue to follow progress and update discharge plan as needed.     Pt from home with spouse. Pt IPTA. No d/c needs at this time.

## 2025-07-18 NOTE — PROGRESS NOTES
Progress Note    Patient Yamilka Veliz  MRN: [id number]  YOB: 1975 Age: 50 y.o. Sex: female  Room: [address] [place] [address]       Admitting Physician: Marcellus Smallwood MD   Date of Admission: 7/17/2025  9:57 AM   Primary Care Physician: Veto Maravilla MD     Subjective:  Yamilka Veliz was seen and examined. We are following for diverticulitis.  -- Patient denies abdominal pain and has had no further nausea and vomiting.  Tolerating clear liquid diet and wants to advance.    ROS:  Constitutional: Denies fever, no change in appetite  Respiratory: Denies cough or shortness of breath  Cardiovascular: Denies chest pain or edema    Objective:  Vital Signs:   Vitals:    07/18/25 1122   BP: (!) 115/102   Pulse: 64   Resp: 18   Temp: 98 °F (36.7 °C)   SpO2: 96%         Physical Exam:  Constitutional: Alert and oriented x 4. No acute distress.   HEENT: Sclera anicteric, mucosal membranes moist  Cardiovascular: Regular rate and rhythm.  No murmurs.  Respiratory: Respirations nonlabored, no crepitus  GI: Abdomen nondistended, soft, and nontender.  Normal active bowel sounds.  No masses palpable.   Rectal: Deferred  Musculoskeletal:  No pitting edema of the lower legs.  Neurological: Gross memory appears intact.  no Asterixis    Intake/Output:    Intake/Output Summary (Last 24 hours) at 7/18/2025 1515  Last data filed at 7/17/2025 1841  Gross per 24 hour   Intake 480 ml   Output --   Net 480 ml        Current Medications:  Current Facility-Administered Medications   Medication Dose Route Frequency Provider Last Rate Last Admin    sodium chloride flush 0.9 % injection 5-40 mL  5-40 mL IntraVENous 2 times per day Marcellus Smallwood MD        sodium chloride flush 0.9 % injection 5-40 mL  5-40 mL IntraVENous PRN Marcellus Smallwood MD        0.9 % sodium chloride infusion   IntraVENous PRN Marcellus Smallwood MD 5 mL/hr at 07/18/25 0110 New Bag at 07/18/25 0110    potassium chloride (KLOR-CON M) extended release tablet 40 mEq  40 mEq

## 2025-07-18 NOTE — CONSULTS
GYNECOLOGY CONSULTATION    Service requesting consult: Hospitalist  Reason for consult: Right ovarian mass.  History of ovarian cancer    History of present illness: Yamilka Veliz 50 y.o. female No LMP recorded.  Admitted for: Diverticulitis    Gynecology issue: Right ovarian mass.  History of left ovarian cancer  The patient reports that she was diagnosed with an ovarian mass that was found to be ovarian cancer back in 2004.  She reports that Pamela Maher and another gynecologist operated on her and initially tried laparoscopy and then converted to a laparotomy for removal of the mass.  She reports having follow-up with oncology for 5 years.  She denies being instructed to get a hysterectomy or contralateral ovary removed at a later date.  She believes this was done at [facility].    1/4/2005:  \"Operative laparoscopy with left oophorectomy with mini-laparotomy, removal and frozen  section of large left 8 cm solid mass. 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  She was having nausea vomiting and diarrhea.  She was seen in the emergency department and imaging was performed and she was diagnosed with diverticulitis.      She was not complaining of left lower quadrant or left-sided pain.  She reports that over the last year and a half she has had some occasional right sided/right lower quadrant discomfort.  This was not too bothersome for her and would come and go.    Diet: Patient reports that she does not tolerate gluten and that she will get stomach upset and diarrhea with this.  She states that she has not been diagnosed with celiac's disease.    Bowel and bladder: No urinary incontinence.  See HPI.    Nausea vomiting: The patient was having nausea and vomiting and she reports that this is resolved and she is feeling better.    Shortness of breath chest pain lightheadedness dizziness: negative      Patient Active Problem List

## 2025-07-18 NOTE — PLAN OF CARE
Problem: Discharge Planning  Goal: Discharge to home or other facility with appropriate resources  7/18/2025 1308 by Corry Lynn, RN  Outcome: Progressing  Flowsheets (Taken 7/18/2025 0919)  Discharge to home or other facility with appropriate resources:   Identify barriers to discharge with patient and caregiver   Arrange for needed discharge resources and transportation as appropriate  7/18/2025 0014 by Janey Navas, RN  Outcome: Progressing  Flowsheets  Taken 7/18/2025 0007  Discharge to home or other facility with appropriate resources: Identify barriers to discharge with patient and caregiver  Taken 7/17/2025 2000  Discharge to home or other facility with appropriate resources: Identify barriers to discharge with patient and caregiver     Problem: Pain  Goal: Verbalizes/displays adequate comfort level or baseline comfort level  7/18/2025 1308 by Corry Lynn, RN  Outcome: Progressing  Flowsheets (Taken 7/18/2025 0604 by Janey Navas, RN)  Verbalizes/displays adequate comfort level or baseline comfort level: Assess pain using appropriate pain scale  7/18/2025 0014 by Janey Navas, RN  Outcome: Progressing  Flowsheets (Taken 7/17/2025 1944)  Verbalizes/displays adequate comfort level or baseline comfort level: Assess pain using appropriate pain scale     Problem: Safety - Adult  Goal: Free from fall injury  7/18/2025 1308 by Corry Lynn, RN  Outcome: Progressing  7/18/2025 0014 by Janey Navas RN  Outcome: Progressing  Flowsheets (Taken 7/17/2025 2000)  Free From Fall Injury: Instruct family/caregiver on patient safety

## 2025-07-19 VITALS
BODY MASS INDEX: 32.53 KG/M2 | OXYGEN SATURATION: 99 % | HEIGHT: 61 IN | DIASTOLIC BLOOD PRESSURE: 97 MMHG | HEART RATE: 69 BPM | RESPIRATION RATE: 17 BRPM | SYSTOLIC BLOOD PRESSURE: 145 MMHG | WEIGHT: 172.3 LBS | TEMPERATURE: 97.8 F

## 2025-07-19 LAB
ALBUMIN SERPL-MCNC: 4.3 G/DL (ref 3.4–5)
ALBUMIN/GLOB SERPL: 1.8 {RATIO}
ALP SERPL-CCNC: 46 U/L (ref 40–129)
ALT SERPL-CCNC: 24 U/L (ref 10–40)
ANION GAP SERPL CALCULATED.3IONS-SCNC: 14 MMOL/L (ref 3–16)
AST SERPL-CCNC: 20 U/L (ref 15–37)
BASOPHILS # BLD: 0.02 K/UL (ref 0–0.2)
BASOPHILS NFR BLD: 0 %
BILIRUB SERPL-MCNC: 0.6 MG/DL (ref 0–1)
BUN SERPL-MCNC: 8 MG/DL (ref 7–20)
CALCIUM SERPL-MCNC: 9.1 MG/DL (ref 8.3–10.6)
CANCER AG125 SERPL-ACNC: 14 U/ML (ref 0–35)
CHLORIDE SERPL-SCNC: 103 MMOL/L (ref 99–110)
CO2 SERPL-SCNC: 21 MMOL/L (ref 21–32)
CREAT SERPL-MCNC: 0.6 MG/DL (ref 0.5–1)
EOSINOPHIL # BLD: 0.23 K/UL (ref 0–0.6)
EOSINOPHILS RELATIVE PERCENT: 3 %
ERYTHROCYTE [DISTWIDTH] IN BLOOD BY AUTOMATED COUNT: 12.9 % (ref 12.4–15.4)
ESTRADIOL LEVEL: 94.6 PG/ML
FSH SERPL-ACNC: 1.9 MIU/ML
GFR, ESTIMATED: >90 ML/MIN/1.73M2
GLUCOSE SERPL-MCNC: 90 MG/DL (ref 70–99)
HCT VFR BLD AUTO: 43 % (ref 36–48)
HGB BLD-MCNC: 14.5 G/DL (ref 12–16)
IMM GRANULOCYTES # BLD AUTO: 0.02 K/UL (ref 0–0.5)
IMM GRANULOCYTES NFR BLD: 0 %
LYMPHOCYTES NFR BLD: 2.24 K/UL (ref 1–5.1)
LYMPHOCYTES RELATIVE PERCENT: 31 %
MCH RBC QN AUTO: 29.6 PG (ref 26–34)
MCHC RBC AUTO-ENTMCNC: 33.7 G/DL (ref 31–36)
MCV RBC AUTO: 87.8 FL (ref 80–100)
MONOCYTES NFR BLD: 0.5 K/UL (ref 0–1.3)
MONOCYTES NFR BLD: 7 %
NEUTROPHILS NFR BLD: 59 %
NEUTS SEG NFR BLD: 4.29 K/UL (ref 1.7–7.7)
PLATELET # BLD AUTO: 210 K/UL (ref 135–450)
PMV BLD AUTO: 10.5 FL
POTASSIUM SERPL-SCNC: 3.9 MMOL/L (ref 3.5–5.1)
PROT SERPL-MCNC: 6.7 G/DL (ref 6.4–8.2)
RBC # BLD AUTO: 4.9 M/UL (ref 4–5.2)
SEND OUT REPORT: NORMAL
SODIUM SERPL-SCNC: 138 MMOL/L (ref 136–145)
TEST NAME: NORMAL
WBC OTHER # BLD: 7.3 K/UL (ref 4–11)

## 2025-07-19 PROCEDURE — 6370000000 HC RX 637 (ALT 250 FOR IP): Performed by: NURSE PRACTITIONER

## 2025-07-19 PROCEDURE — 2500000003 HC RX 250 WO HCPCS: Performed by: FAMILY MEDICINE

## 2025-07-19 PROCEDURE — 36415 COLL VENOUS BLD VENIPUNCTURE: CPT

## 2025-07-19 PROCEDURE — 85025 COMPLETE CBC W/AUTO DIFF WBC: CPT

## 2025-07-19 PROCEDURE — 83520 IMMUNOASSAY QUANT NOS NONAB: CPT

## 2025-07-19 PROCEDURE — 83001 ASSAY OF GONADOTROPIN (FSH): CPT

## 2025-07-19 PROCEDURE — 82670 ASSAY OF TOTAL ESTRADIOL: CPT

## 2025-07-19 PROCEDURE — 6370000000 HC RX 637 (ALT 250 FOR IP): Performed by: FAMILY MEDICINE

## 2025-07-19 PROCEDURE — 6360000002 HC RX W HCPCS: Performed by: FAMILY MEDICINE

## 2025-07-19 PROCEDURE — 80053 COMPREHEN METABOLIC PANEL: CPT

## 2025-07-19 RX ORDER — LEVOFLOXACIN 750 MG/1
750 TABLET, FILM COATED ORAL DAILY
Qty: 6 TABLET | Refills: 0 | Status: ON HOLD | OUTPATIENT
Start: 2025-07-19 | End: 2025-07-25 | Stop reason: HOSPADM

## 2025-07-19 RX ORDER — HYDROXYZINE PAMOATE 25 MG/1
25 CAPSULE ORAL 3 TIMES DAILY PRN
Status: DISCONTINUED | OUTPATIENT
Start: 2025-07-19 | End: 2025-07-19 | Stop reason: HOSPADM

## 2025-07-19 RX ORDER — METRONIDAZOLE 500 MG/1
500 TABLET ORAL 3 TIMES DAILY
Qty: 18 TABLET | Refills: 0 | Status: ON HOLD | OUTPATIENT
Start: 2025-07-19 | End: 2025-07-25 | Stop reason: HOSPADM

## 2025-07-19 RX ADMIN — Medication 10 ML: at 08:45

## 2025-07-19 RX ADMIN — LEVOTHYROXINE SODIUM 175 MCG: 0.12 TABLET ORAL at 05:09

## 2025-07-19 RX ADMIN — HYDROXYZINE PAMOATE 25 MG: 25 CAPSULE ORAL at 14:01

## 2025-07-19 RX ADMIN — METRONIDAZOLE 500 MG: 500 INJECTION, SOLUTION INTRAVENOUS at 08:45

## 2025-07-19 ASSESSMENT — PAIN SCALES - GENERAL: PAINLEVEL_OUTOF10: 4

## 2025-07-19 NOTE — PROGRESS NOTES
Hospitalist Progress Note      PCP: Veto Maravilla MD    Date of Admission: 7/17/2025    Chief Complaint: Abdominal pain, nausea vomiting    Hospital Course: Yamilka Veliz is a 50 y.o. female with pmh of hypothyroidism, ovarian cancer who presents with abdominal pain, nausea and vomiting.  Was having right lower abdominal discomfort nausea and vomiting for about 24 hours.  Presented to the ED for further evaluation.  Has a history of ovarian cancer with left oophorectomy.  Admitted for further workup and treatment.  See details below.    Subjective: Patient sitting up in bed, reports she feels much better today.  Agreeable to remain in hospital for IV antibiotics.  Extensively reviewed CT results from this admission and from 1 year ago.  Discussed the intraperitoneal mass as well as liver cyst and right ovarian lesion.  She is agreeable to GYN, to determine if biopsy of intraperitoneal mass should be done prior to discharge.  She states she does have a GYN that treated her with her ovarian cancer years ago but she has not followed up because she did not have insurance until recently.  She states she would like to follow-up with them outpatient.  She denies chest pain shortness of breath headache lightheadedness.  Reviewed plan of care, deny for any questions.  No family at bedside.    Assessment/Plan:    Diverticulitis  Nausea vomiting abdominal pain  - Symptoms improved  -CT scan shows moderate colonic diverticulosis with mild fat stranding along the transverse colon suspicious for acute uncomplicated diverticulitis   -Continue Levaquin and Flagyl  -IV fluids  - GI consult    Intraperitoneal mass  - Per CT has increased in size, currently 3.5 x 2 cm  - GYN consult to determine if biopsy is needed at this time or can be done outpatient    Hepatic lesion  - Per CT compatible with benign hemangioma, discussed with patient    Right adnexal lesion  - History of left ovarian cancer status post oophorectomy  - CT shows

## 2025-07-19 NOTE — PLAN OF CARE
Problem: Pain  Goal: Verbalizes/displays adequate comfort level or baseline comfort level  7/18/2025 2023 by Catherine Villalobos RN  Outcome: Progressing  7/18/2025 1308 by Corry Lynn RN  Outcome: Progressing  Flowsheets (Taken 7/18/2025 0604 by Janey Navas, RN)  Verbalizes/displays adequate comfort level or baseline comfort level: Assess pain using appropriate pain scale     Problem: Safety - Adult  Goal: Free from fall injury  7/18/2025 2023 by Catherine Villalobos RN  Outcome: Progressing  7/18/2025 1308 by Corry Lynn, RN  Outcome: Progressing     Problem: Discharge Planning  Goal: Discharge to home or other facility with appropriate resources  7/18/2025 2023 by Catherine Villalobos RN  Outcome: Progressing  7/18/2025 1308 by Corry Lynn, RN  Outcome: Progressing  Flowsheets (Taken 7/18/2025 0919)  Discharge to home or other facility with appropriate resources:   Identify barriers to discharge with patient and caregiver   Arrange for needed discharge resources and transportation as appropriate

## 2025-07-19 NOTE — DISCHARGE SUMMARY
7.3 07/19/2025 06:15 AM    HGB 14.5 07/19/2025 06:15 AM    HCT 43.0 07/19/2025 06:15 AM     07/19/2025 06:15 AM       Renal:    Lab Results   Component Value Date/Time     07/19/2025 06:15 AM    K 3.9 07/19/2025 06:15 AM     07/19/2025 06:15 AM    CO2 21 07/19/2025 06:15 AM    BUN 8 07/19/2025 06:15 AM    CREATININE 0.6 07/19/2025 06:15 AM    CALCIUM 9.1 07/19/2025 06:15 AM         Significant Diagnostic Studies    Radiology:   CT ABDOMEN PELVIS W IV CONTRAST Additional Contrast? None   Final Result      Moderate colonic diverticulosis, with mild fat stranding along the transverse colon suspicious for acute uncomplicated diverticulitis.      3.5 x 2.0 cm anterior intraperitoneal mass, increased. This is indeterminate, however given history of ovarian malignancy, metastasis is not excluded. Recommend tissue sampling.      Previously described indeterminant hepatic low-density lesion is compatible with a benign hemangioma.      4 cm right adnexal region low-density lesion, likely ovarian cyst. Given size and history of ovarian malignancy, recommend follow-up pelvic ultrasound in 6-12 weeks.            Electronically signed by Jose Eubanks MD      US PELVIS COMPLETE    (Results Pending)          Consults:     IP CONSULT TO GI  IP CONSULT TO OB GYN    Disposition: Home    Condition at Discharge: Stable    Discharge Instructions/Follow-up:      Follow up with PCP in 1 week    Follow up with GYN ASAP- they need to order outpatient pelvic ultrasound    Follow up with GI for outpatient colonoscopy in 4-6 weeks    Code Status:  Prior     Activity: activity as tolerated    Diet: regular diet      Discharge Medications:     Discharge Medication List as of 7/19/2025 12:33 PM             Details   levoFLOXacin (LEVAQUIN) 750 MG tablet Take 1 tablet by mouth daily for 6 days, Disp-6 tablet, R-0Normal      metroNIDAZOLE (FLAGYL) 500 MG tablet Take 1 tablet by mouth 3 times daily for 6 days, Disp-18

## 2025-07-19 NOTE — CARE COORDINATION
DISCHARGE ORDER  Date/Time 2025 12:32 PM  Completed by: Paola Davies RN, Case Management    Patient Name: Yamilka Veliz      : 1975  Admitting Diagnosis: Generalized abdominal pain [R10.84]  Right ovarian cyst [N83.201]  Intraabdominal mass [R19.00]  Diverticulitis large intestine w/o perforation or abscess w/o bleeding [K57.32]  Acute diverticulitis [K57.92]  Nausea and vomiting, unspecified vomiting type [R11.2]      Admit order Date and Status:25  (verify MD's last order for status of admission)      Noted discharge order.   If applicable PT/OT recommendation at Discharge: na  DME recommendation by PT/OT:na    Discharge Plan: Pt to dc home. No CM needs for discharge.     Date of Last IMM Given: na    Reviewed chart.  Role of discharge planner explained and patient verbalized understanding. Discharge order is noted.      Pt is being d/c'd to home today. Pt's O2 sats are 99% on ra.    Discharge timeout done with CM/Pt/RN. All discharge needs and concerns addressed.

## 2025-07-19 NOTE — PROGRESS NOTES
Shift assessment done. All night time medications given per MAR. Patient took all her oral medications whole with water, tolerated well. All fall precautions implemented. All needs attended. Electronically signed by Catherine Villalobos RN on 7/18/2025 at 8:24 PM

## 2025-07-19 NOTE — DISCHARGE INSTRUCTIONS
Follow up with PCP in 1 week    Follow up with GYN ASAP- they need to order outpatient pelvic ultrasound    Follow up with GI for outpatient colonoscopy in 4-6 weeks   Smoking Cessation Program:   [facility] is now offering a proven, interactive, text-based smoking cessation program for FREE!  To register, please text QUITNOW -[phone] or visit [email or web address]  For more information, please call: [phone]  Smoking Cessation Program:   [facility] is now offering a proven, interactive, text-based smoking cessation program for FREE!  To register, please text QUITNOW -[phone] or visit [email or web address]  For more information, please call: [phone]  Hold metformin till you see your PCP, resume your other diabetes medicines  as before    Amiodarone is a medicine to help keep your heart in its normal rhythm    Metoprolol is a medication to help the heart and prevent it from racing, use instead of the cardizem

## 2025-07-19 NOTE — PLAN OF CARE
Problem: Discharge Planning  Goal: Discharge to home or other facility with appropriate resources  7/19/2025 1012 by Corry Lynn RN  Outcome: Progressing  Flowsheets (Taken 7/19/2025 0846)  Discharge to home or other facility with appropriate resources:   Identify barriers to discharge with patient and caregiver   Arrange for needed discharge resources and transportation as appropriate  7/18/2025 2023 by Catherine Villalobos, RN  Outcome: Progressing     Problem: Pain  Goal: Verbalizes/displays adequate comfort level or baseline comfort level  7/19/2025 1012 by Corry Lynn, RN  Outcome: Progressing  7/18/2025 2023 by Catherine Villalobos, RN  Outcome: Progressing     Problem: Safety - Adult  Goal: Free from fall injury  7/19/2025 1012 by Corry Lynn, RN  Outcome: Progressing  7/18/2025 2023 by Catherine Villalobos, RN  Outcome: Progressing

## 2025-07-21 ENCOUNTER — OFFICE VISIT (OUTPATIENT)
Age: 50
End: 2025-07-21

## 2025-07-21 VITALS
BODY MASS INDEX: 32.5 KG/M2 | SYSTOLIC BLOOD PRESSURE: 120 MMHG | OXYGEN SATURATION: 98 % | HEART RATE: 75 BPM | WEIGHT: 172 LBS | DIASTOLIC BLOOD PRESSURE: 86 MMHG

## 2025-07-21 DIAGNOSIS — Z85.43 HISTORY OF OVARIAN CANCER: ICD-10-CM

## 2025-07-21 DIAGNOSIS — R19.00 INTRAABDOMINAL MASS: ICD-10-CM

## 2025-07-21 DIAGNOSIS — N83.201 RIGHT OVARIAN CYST: Primary | ICD-10-CM

## 2025-07-21 NOTE — PROGRESS NOTES
Patient seen for follow-up after hospitalization.  She reports that she feels fine.  See hospitalization notes.  She was admitted for IV antibiotic therapy for diverticulitis.  As an incidental finding she was found to have a right ovarian cystic mass.  She was also found to have a subumbilicus peritoneal/abdominal mass.  She has a remote history of a granulosa cell tumor.  Her  level came back normal.  Her estrogen level was normal and her FSH was low at 1.9.  We are still waiting on her anti-müllerian hormone level and her inhibin B level.  She needed an outpatient order for pelvic ultrasound, I had previously ordered inpatient when the patient was hospitalized.   She is given instructions on getting her ultrasound done and we will follow-up with her with results when available.  I discussed with her possible need for GYN consultation and the patient is able to voiced understanding regarding this.

## 2025-07-22 ENCOUNTER — HOSPITAL ENCOUNTER (OUTPATIENT)
Age: 50
Discharge: HOME | End: 2025-07-22
Attending: OBSTETRICS & GYNECOLOGY
Payer: MEDICAID

## 2025-07-22 DIAGNOSIS — Z85.43 HISTORY OF OVARIAN CANCER: ICD-10-CM

## 2025-07-22 DIAGNOSIS — N83.201 RIGHT OVARIAN CYST: ICD-10-CM

## 2025-07-22 LAB — ANTI-MULLERIAN HORMONE: 6.68 NG/ML

## 2025-07-22 PROCEDURE — 76856 US EXAM PELVIC COMPLETE: CPT

## 2025-07-23 ENCOUNTER — APPOINTMENT (OUTPATIENT)
Age: 50
End: 2025-07-23
Payer: MEDICAID

## 2025-07-23 ENCOUNTER — HOSPITAL ENCOUNTER (OUTPATIENT)
Age: 50
Setting detail: OBSERVATION
Discharge: HOME | End: 2025-07-25
Attending: EMERGENCY MEDICINE | Admitting: STUDENT IN AN ORGANIZED HEALTH CARE EDUCATION/TRAINING PROGRAM
Payer: MEDICAID

## 2025-07-23 ENCOUNTER — APPOINTMENT (OUTPATIENT)
Age: 50
End: 2025-07-23
Attending: SURGERY
Payer: MEDICAID

## 2025-07-23 DIAGNOSIS — R10.9 ABDOMINAL PAIN, UNSPECIFIED ABDOMINAL LOCATION: ICD-10-CM

## 2025-07-23 DIAGNOSIS — R11.2 NAUSEA AND VOMITING, UNSPECIFIED VOMITING TYPE: Primary | ICD-10-CM

## 2025-07-23 DIAGNOSIS — R10.84 GENERALIZED ABDOMINAL PAIN: ICD-10-CM

## 2025-07-23 LAB
ALBUMIN SERPL-MCNC: 5 G/DL (ref 3.4–5)
ALBUMIN/GLOB SERPL: 1.6 {RATIO}
ALP SERPL-CCNC: 54 U/L (ref 40–129)
ALT SERPL-CCNC: 38 U/L (ref 10–40)
ANION GAP SERPL CALCULATED.3IONS-SCNC: 16 MMOL/L (ref 3–16)
AST SERPL-CCNC: 32 U/L (ref 15–37)
BACTERIA URNS QL MICRO: ABNORMAL
BASOPHILS # BLD: 0.01 K/UL (ref 0–0.2)
BASOPHILS NFR BLD: 0 %
BILIRUB DIRECT SERPL-MCNC: 0.3 MG/DL (ref 0–0.3)
BILIRUB INDIRECT SERPL-MCNC: 0.4 MG/DL (ref 0–1)
BILIRUB SERPL-MCNC: 0.7 MG/DL (ref 0–1)
BILIRUB UR QL STRIP: NEGATIVE
BUN SERPL-MCNC: 10 MG/DL (ref 7–20)
CALCIUM SERPL-MCNC: 10 MG/DL (ref 8.3–10.6)
CHLORIDE SERPL-SCNC: 100 MMOL/L (ref 99–110)
CLARITY UR: CLEAR
CO2 SERPL-SCNC: 23 MMOL/L (ref 21–32)
COLOR UR: YELLOW
CREAT SERPL-MCNC: 0.7 MG/DL (ref 0.5–1)
EOSINOPHIL # BLD: 0.14 K/UL (ref 0–0.6)
EOSINOPHILS RELATIVE PERCENT: 1 %
EPI CELLS #/AREA URNS HPF: ABNORMAL /HPF
ERYTHROCYTE [DISTWIDTH] IN BLOOD BY AUTOMATED COUNT: 12.9 % (ref 12.4–15.4)
GFR, ESTIMATED: >90 ML/MIN/1.73M2
GLUCOSE SERPL-MCNC: 100 MG/DL (ref 70–99)
GLUCOSE UR STRIP-MCNC: NEGATIVE MG/DL
HCT VFR BLD AUTO: 52.2 % (ref 36–48)
HGB BLD-MCNC: 17.4 G/DL (ref 12–16)
HGB UR QL STRIP.AUTO: ABNORMAL
IMM GRANULOCYTES # BLD AUTO: 0.03 K/UL (ref 0–0.5)
IMM GRANULOCYTES NFR BLD: 0 %
KETONES UR STRIP-MCNC: >80 MG/DL
LEUKOCYTE ESTERASE UR QL STRIP: ABNORMAL
LIPASE SERPL-CCNC: 40 U/L (ref 13–60)
LYMPHOCYTES NFR BLD: 1.71 K/UL (ref 1–5.1)
LYMPHOCYTES RELATIVE PERCENT: 13 %
MCH RBC QN AUTO: 29.3 PG (ref 26–34)
MCHC RBC AUTO-ENTMCNC: 33.3 G/DL (ref 31–36)
MCV RBC AUTO: 88 FL (ref 80–100)
MONOCYTES NFR BLD: 0.69 K/UL (ref 0–1.3)
MONOCYTES NFR BLD: 5 %
MUCOUS THREADS URNS QL MICRO: PRESENT
NEUTROPHILS NFR BLD: 81 %
NEUTS SEG NFR BLD: 10.67 K/UL (ref 1.7–7.7)
NITRITE UR QL STRIP: NEGATIVE
PH UR STRIP: 6 [PH] (ref 5–8)
PLATELET # BLD AUTO: 233 K/UL (ref 135–450)
PMV BLD AUTO: 10.4 FL
POTASSIUM SERPL-SCNC: 3.8 MMOL/L (ref 3.5–5.1)
PROT SERPL-MCNC: 8 G/DL (ref 6.4–8.2)
PROT UR STRIP-MCNC: ABNORMAL MG/DL
RBC # BLD AUTO: 5.93 M/UL (ref 4–5.2)
RBC #/AREA URNS HPF: ABNORMAL /HPF
SODIUM SERPL-SCNC: 139 MMOL/L (ref 136–145)
SP GR UR STRIP: <1.005 (ref 1–1.03)
TROPONIN I SERPL HS-MCNC: <6 NG/L (ref 0–14)
UROBILINOGEN UR STRIP-ACNC: 0.2 EU/DL (ref 0–1)
WBC #/AREA URNS HPF: ABNORMAL /HPF
WBC OTHER # BLD: 13.3 K/UL (ref 4–11)

## 2025-07-23 PROCEDURE — 96375 TX/PRO/DX INJ NEW DRUG ADDON: CPT

## 2025-07-23 PROCEDURE — G0378 HOSPITAL OBSERVATION PER HR: HCPCS

## 2025-07-23 PROCEDURE — 6360000004 HC RX CONTRAST MEDICATION: Performed by: EMERGENCY MEDICINE

## 2025-07-23 PROCEDURE — 6360000002 HC RX W HCPCS: Performed by: EMERGENCY MEDICINE

## 2025-07-23 PROCEDURE — 99285 EMERGENCY DEPT VISIT HI MDM: CPT

## 2025-07-23 PROCEDURE — 96361 HYDRATE IV INFUSION ADD-ON: CPT

## 2025-07-23 PROCEDURE — 82248 BILIRUBIN DIRECT: CPT

## 2025-07-23 PROCEDURE — 74177 CT ABD & PELVIS W/CONTRAST: CPT

## 2025-07-23 PROCEDURE — 2580000003 HC RX 258: Performed by: EMERGENCY MEDICINE

## 2025-07-23 PROCEDURE — 83690 ASSAY OF LIPASE: CPT

## 2025-07-23 PROCEDURE — 80053 COMPREHEN METABOLIC PANEL: CPT

## 2025-07-23 PROCEDURE — 93005 ELECTROCARDIOGRAM TRACING: CPT | Performed by: EMERGENCY MEDICINE

## 2025-07-23 PROCEDURE — 85025 COMPLETE CBC W/AUTO DIFF WBC: CPT

## 2025-07-23 PROCEDURE — 96374 THER/PROPH/DIAG INJ IV PUSH: CPT

## 2025-07-23 PROCEDURE — 81001 URINALYSIS AUTO W/SCOPE: CPT

## 2025-07-23 PROCEDURE — 84484 ASSAY OF TROPONIN QUANT: CPT

## 2025-07-23 RX ORDER — DIAZEPAM 10 MG/2ML
5 INJECTION, SOLUTION INTRAMUSCULAR; INTRAVENOUS ONCE
Status: COMPLETED | OUTPATIENT
Start: 2025-07-23 | End: 2025-07-23

## 2025-07-23 RX ORDER — 0.9 % SODIUM CHLORIDE 0.9 %
1000 INTRAVENOUS SOLUTION INTRAVENOUS ONCE
Status: COMPLETED | OUTPATIENT
Start: 2025-07-23 | End: 2025-07-23

## 2025-07-23 RX ORDER — IOPAMIDOL 755 MG/ML
75 INJECTION, SOLUTION INTRAVASCULAR
Status: COMPLETED | OUTPATIENT
Start: 2025-07-23 | End: 2025-07-23

## 2025-07-23 RX ORDER — ONDANSETRON 2 MG/ML
4 INJECTION INTRAMUSCULAR; INTRAVENOUS ONCE
Status: COMPLETED | OUTPATIENT
Start: 2025-07-23 | End: 2025-07-23

## 2025-07-23 RX ADMIN — DIAZEPAM 5 MG: 5 INJECTION, SOLUTION INTRAMUSCULAR; INTRAVENOUS at 18:29

## 2025-07-23 RX ADMIN — SODIUM CHLORIDE 1000 ML: 0.9 INJECTION, SOLUTION INTRAVENOUS at 18:34

## 2025-07-23 RX ADMIN — ONDANSETRON 4 MG: 2 INJECTION, SOLUTION INTRAMUSCULAR; INTRAVENOUS at 18:29

## 2025-07-23 RX ADMIN — IOPAMIDOL 75 ML: 755 INJECTION, SOLUTION INTRAVENOUS at 19:00

## 2025-07-23 ASSESSMENT — PAIN DESCRIPTION - PAIN TYPE
TYPE: ACUTE PAIN
TYPE: ACUTE PAIN

## 2025-07-23 ASSESSMENT — LIFESTYLE VARIABLES
HOW MANY STANDARD DRINKS CONTAINING ALCOHOL DO YOU HAVE ON A TYPICAL DAY: PATIENT DOES NOT DRINK
HOW OFTEN DO YOU HAVE A DRINK CONTAINING ALCOHOL: NEVER

## 2025-07-23 ASSESSMENT — PAIN SCALES - WONG BAKER: WONGBAKER_NUMERICALRESPONSE: 6;8

## 2025-07-23 ASSESSMENT — PAIN DESCRIPTION - DESCRIPTORS
DESCRIPTORS: DULL
DESCRIPTORS: DULL

## 2025-07-23 ASSESSMENT — PAIN SCALES - GENERAL
PAINLEVEL_OUTOF10: 7
PAINLEVEL_OUTOF10: 7

## 2025-07-23 ASSESSMENT — PAIN DESCRIPTION - FREQUENCY: FREQUENCY: INTERMITTENT

## 2025-07-23 ASSESSMENT — PAIN DESCRIPTION - ORIENTATION: ORIENTATION: RIGHT;LEFT

## 2025-07-23 ASSESSMENT — PAIN DESCRIPTION - ONSET: ONSET: GRADUAL

## 2025-07-23 ASSESSMENT — PAIN DESCRIPTION - LOCATION
LOCATION: ABDOMEN
LOCATION: ABDOMEN

## 2025-07-23 ASSESSMENT — PAIN - FUNCTIONAL ASSESSMENT: PAIN_FUNCTIONAL_ASSESSMENT: PREVENTS OR INTERFERES SOME ACTIVE ACTIVITIES AND ADLS

## 2025-07-23 NOTE — ED TRIAGE NOTES
Pt presents to the ED with complaints of nausea, vomiting, and abdominal pain. Pt states that she was admitted in the hospital for a few days after a mass was found in her abdomin. PT states she started vomiting this morning and has not been able to keep anything down since.    Methotrexate Pregnancy And Lactation Text: This medication is Pregnancy Category X and is known to cause fetal harm. This medication is excreted in breast milk.

## 2025-07-23 NOTE — ED TRIAGE NOTES
[facility] EMERGENCY DEPARTMENT     EMERGENCY DEPARTMENT ENCOUNTER            Pt Name: Yamilka Veliz   MRN: [id number]   Birthdate 1975   Date of evaluation: 7/23/2025   Provider: Elmer Heart DO   PCP: Veto Maravilla MD   Note Started: 5:25 PM EDT 7/23/25          CHIEF COMPLAINT     Chief Complaint   Patient presents with    Vomiting    Abdominal Pain             HISTORY OF PRESENT ILLNESS:   History from : Patient   Limitations to history : None     Yamilka Veliz is a 50 y.o. female who presents complaints of some mild abdominal pain she is strep is aching in nature, patient states has been there for the last day or so.  She was recently in the hospital just about a week ago for similar.  The patient apparently was diagnosed with diverticulitis, also had abdominal mass that is currently being worked up.  Patient states that she had some runny stools over the past couple of days.  The patient has also had emesis today.  She has felt hot, she has not had any chills.  She denies taking her temperature at home.  She has no headache, no sore throat, no cough or congestion.  She denies any shortness of breath.  Otherwise nothing else makes symptoms much better or worse.  The patient reports increasing anxiety over the past couple of weeks.  Patient is not homicidal or suicidal.  The patient does have a remote history of ovarian cancer about 20 years ago.  Patient has no other rashes.  Patient denies any severe back pain.  Patient states pain is moderate in intensity, nothing makes it better or worse    Nursing Notes were all reviewed and agreed with, or any disagreements were addressed in the HPI.     REVIEW OF SYSTEMS :    Positives and Pertinent negatives as per HPI.      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    Past Surgical History:   Procedure Laterality Date    BUNIONECTOMY  12/06/2012    left

## 2025-07-23 NOTE — ED PROVIDER NOTES
[facility] EMERGENCY DEPARTMENT     EMERGENCY DEPARTMENT ENCOUNTER            Pt Name: Yamilka Veliz   MRN: [id number]   Birthdate 1975   Date of evaluation: 7/23/2025   Provider: Elmer Heart DO   PCP: eVto Maravilla MD   Note Started: 4:43 PM EDT 7/23/25          CHIEF COMPLAINT     Chief Complaint   Patient presents with    Vomiting    Abdominal Pain             HISTORY OF PRESENT ILLNESS:   History from : Patient   Limitations to history : None     Yamilka Veliz is a 50 y.o. female who presents with mild mid to left side abdominal pain, as well as nausea and vomiting.  The patient was recently in the hospital, diagnosed with diverticulitis, patient was also diagnosed with a pelvic mass.  Patient apparently has been trying to get further workup for this.  The patient denies any other significant chest pain, shortness of breath, fevers or chills.  Patient has stated that she has felt hot, however has not taken her temperature.  Patient denies any other constipation issues, patient states that she has had some looser stools over the past couple of days, no black or bloody stools.  Otherwise nothing else makes symptoms better or worse.  Patient states that her pain is about a 4 at this point in time to the mid to left side of abdomen, aching in intensity.    Nursing Notes were all reviewed and agreed with, or any disagreements were addressed in the HPI.     REVIEW OF SYSTEMS :    Positives and Pertinent negatives as per HPI.      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    Past Surgical History:   Procedure Laterality Date    BUNIONECTOMY  12/06/2012    left    CARPAL TUNNEL RELEASE  2007    bilateral    OVARY REMOVAL        CURRENTMEDICATIONS       Previous Medications    DICYCLOMINE (BENTYL) 20 MG TABLET    Take 1 tablet by mouth 4 times daily for 10 days    LEVOFLOXACIN (LEVAQUIN) 750 MG TABLET     Take 1 tablet by mouth daily for 6 days    LEVOTHYROXINE (SYNTHROID) 175 MCG TABLET    Take 1 tablet by mouth Daily    METRONIDAZOLE (FLAGYL) 500 MG TABLET    Take 1 tablet by mouth 3 times daily for 6 days      SCREENINGS                           CIWA Assessment  BP: (!) 174/92  Pulse: 77                  PHYSICAL EXAM :  ED Triage Vitals   BP Systolic BP Percentile Diastolic BP Percentile Temp Temp Source Pulse Respirations SpO2   07/23/25 1640 -- -- 07/23/25 1640 07/23/25 1640 07/23/25 1640 07/23/25 1640 07/23/25 1640   (!) 174/92   97.4 °F (36.3 °C) Temporal 77 18 97 %      Height Weight - Scale         07/23/25 1639 07/23/25 1639         1.549 m (5' 1\") 82.6 kg (182 lb 1.6 oz)            GENERAL APPEARANCE: Awake and alert. Cooperative. No acute distress.  HEAD: Normocephalic. Atraumatic.  EYES: PERRL. EOM's grossly intact.   ENT: Mucous membranes are moist.   NECK: Supple, trachea midline.   HEART: Regular rate and rhythm without clicks rubs gallops or murmurs  LUNGS: Respirations unlabored. CTAB. Good air exchange. No wheezes, rales, or rhonchi.  Speaking comfortably in full sentences.   ABDOMEN: Soft.  Mild mid abdominal discomfort. Non-tender. No guarding or rebound. Normal Bowel sounds.  EXTREMITIES: No peripheral edema. MAEE. No acute deformities.  SKIN: Warm and dry. No acute rashes.   NEUROLOGICAL: Alert and oriented X 3. CN II-XII intact. No gross facial drooping.  Strength 5/5 in all extremities.  Sensation intact. No pronator drift. Normal coordination.   PSYCHIATRIC: Normal mood and affect.    DIAGNOSTIC RESULTS     LABS:   Labs Reviewed   CBC WITH AUTO DIFFERENTIAL - Abnormal; Notable for the following components:       Result Value    WBC 13.3 (*)     RBC 5.93 (*)     Hemoglobin 17.4 (*)     Hematocrit 52.2 (*)     Neutrophils Absolute 10.67 (*)     All other components within normal limits   BASIC METABOLIC PANEL - Abnormal; Notable for the following components:    Glucose 100 (*)     All other

## 2025-07-24 ENCOUNTER — ANESTHESIA EVENT (OUTPATIENT)
Age: 50
End: 2025-07-24
Payer: MEDICAID

## 2025-07-24 ENCOUNTER — APPOINTMENT (OUTPATIENT)
Age: 50
End: 2025-07-24
Attending: SURGERY
Payer: MEDICAID

## 2025-07-24 PROBLEM — R11.2 NAUSEA AND VOMITING: Status: ACTIVE | Noted: 2025-07-24

## 2025-07-24 LAB
ALBUMIN SERPL-MCNC: 4 G/DL (ref 3.4–5)
ALBUMIN/GLOB SERPL: 1.9 {RATIO}
ALP SERPL-CCNC: 41 U/L (ref 40–129)
ALT SERPL-CCNC: 26 U/L (ref 10–40)
ANION GAP SERPL CALCULATED.3IONS-SCNC: 12 MMOL/L (ref 3–16)
AST SERPL-CCNC: 19 U/L (ref 15–37)
BASOPHILS # BLD: 0.02 K/UL (ref 0–0.2)
BASOPHILS NFR BLD: 0 %
BILIRUB SERPL-MCNC: 0.4 MG/DL (ref 0–1)
BUN SERPL-MCNC: 8 MG/DL (ref 7–20)
CALCIUM SERPL-MCNC: 8.5 MG/DL (ref 8.3–10.6)
CHLORIDE SERPL-SCNC: 103 MMOL/L (ref 99–110)
CO2 SERPL-SCNC: 24 MMOL/L (ref 21–32)
CREAT SERPL-MCNC: 0.7 MG/DL (ref 0.5–1)
EKG ATRIAL RATE: 66 BPM
EKG DIAGNOSIS: NORMAL
EKG P AXIS: 44 DEGREES
EKG P-R INTERVAL: 148 MS
EKG Q-T INTERVAL: 428 MS
EKG QRS DURATION: 68 MS
EKG QTC CALCULATION (BAZETT): 448 MS
EKG R AXIS: 38 DEGREES
EKG T AXIS: 32 DEGREES
EKG VENTRICULAR RATE: 66 BPM
EOSINOPHIL # BLD: 0.19 K/UL (ref 0–0.6)
EOSINOPHILS RELATIVE PERCENT: 3 %
ERYTHROCYTE [DISTWIDTH] IN BLOOD BY AUTOMATED COUNT: 13.1 % (ref 12.4–15.4)
GFR, ESTIMATED: >90 ML/MIN/1.73M2
GLUCOSE SERPL-MCNC: 104 MG/DL (ref 70–99)
HCT VFR BLD AUTO: 42.8 % (ref 36–48)
HGB BLD-MCNC: 14 G/DL (ref 12–16)
IMM GRANULOCYTES # BLD AUTO: 0.02 K/UL (ref 0–0.5)
IMM GRANULOCYTES NFR BLD: 0 %
INR PPP: 1 (ref 0.9–1.1)
LYMPHOCYTES NFR BLD: 2.12 K/UL (ref 1–5.1)
LYMPHOCYTES RELATIVE PERCENT: 29 %
MCH RBC QN AUTO: 29 PG (ref 26–34)
MCHC RBC AUTO-ENTMCNC: 32.7 G/DL (ref 31–36)
MCV RBC AUTO: 88.6 FL (ref 80–100)
MONOCYTES NFR BLD: 0.59 K/UL (ref 0–1.3)
MONOCYTES NFR BLD: 8 %
NEUTROPHILS NFR BLD: 59 %
NEUTS SEG NFR BLD: 4.27 K/UL (ref 1.7–7.7)
PLATELET # BLD AUTO: 195 K/UL (ref 135–450)
PMV BLD AUTO: 10.4 FL (ref 9.4–12.4)
POTASSIUM SERPL-SCNC: 3.6 MMOL/L (ref 3.5–5.1)
PROT SERPL-MCNC: 6.1 G/DL (ref 6.4–8.2)
PROTHROMBIN TIME: 13.3 SEC (ref 12.1–14.9)
RBC # BLD AUTO: 4.83 M/UL (ref 4–5.2)
SODIUM SERPL-SCNC: 139 MMOL/L (ref 136–145)
WBC OTHER # BLD: 7.2 K/UL (ref 4–11)

## 2025-07-24 PROCEDURE — 6360000002 HC RX W HCPCS: Performed by: RADIOLOGY

## 2025-07-24 PROCEDURE — 93010 ELECTROCARDIOGRAM REPORT: CPT | Performed by: INTERNAL MEDICINE

## 2025-07-24 PROCEDURE — 36415 COLL VENOUS BLD VENIPUNCTURE: CPT

## 2025-07-24 PROCEDURE — 88305 TISSUE EXAM BY PATHOLOGIST: CPT

## 2025-07-24 PROCEDURE — G0378 HOSPITAL OBSERVATION PER HR: HCPCS

## 2025-07-24 PROCEDURE — 6370000000 HC RX 637 (ALT 250 FOR IP): Performed by: STUDENT IN AN ORGANIZED HEALTH CARE EDUCATION/TRAINING PROGRAM

## 2025-07-24 PROCEDURE — 85610 PROTHROMBIN TIME: CPT

## 2025-07-24 PROCEDURE — 96361 HYDRATE IV INFUSION ADD-ON: CPT

## 2025-07-24 PROCEDURE — 6370000000 HC RX 637 (ALT 250 FOR IP): Performed by: INTERNAL MEDICINE

## 2025-07-24 PROCEDURE — 2580000003 HC RX 258: Performed by: STUDENT IN AN ORGANIZED HEALTH CARE EDUCATION/TRAINING PROGRAM

## 2025-07-24 PROCEDURE — 99222 1ST HOSP IP/OBS MODERATE 55: CPT | Performed by: SURGERY

## 2025-07-24 PROCEDURE — 88341 IMHCHEM/IMCYTCHM EA ADD ANTB: CPT

## 2025-07-24 PROCEDURE — 6360000002 HC RX W HCPCS: Performed by: STUDENT IN AN ORGANIZED HEALTH CARE EDUCATION/TRAINING PROGRAM

## 2025-07-24 PROCEDURE — 80053 COMPREHEN METABOLIC PANEL: CPT

## 2025-07-24 PROCEDURE — 85025 COMPLETE CBC W/AUTO DIFF WBC: CPT

## 2025-07-24 PROCEDURE — 2500000003 HC RX 250 WO HCPCS: Performed by: STUDENT IN AN ORGANIZED HEALTH CARE EDUCATION/TRAINING PROGRAM

## 2025-07-24 PROCEDURE — 96376 TX/PRO/DX INJ SAME DRUG ADON: CPT

## 2025-07-24 PROCEDURE — 96375 TX/PRO/DX INJ NEW DRUG ADDON: CPT

## 2025-07-24 PROCEDURE — 88342 IMHCHEM/IMCYTCHM 1ST ANTB: CPT

## 2025-07-24 PROCEDURE — 2709999900 CT BIOPSY ABDOMEN RETROPERITONEUM

## 2025-07-24 RX ORDER — SODIUM CHLORIDE 0.9 % (FLUSH) 0.9 %
5-40 SYRINGE (ML) INJECTION EVERY 12 HOURS SCHEDULED
Status: DISCONTINUED | OUTPATIENT
Start: 2025-07-24 | End: 2025-07-25 | Stop reason: HOSPADM

## 2025-07-24 RX ORDER — ONDANSETRON 2 MG/ML
4 INJECTION INTRAMUSCULAR; INTRAVENOUS EVERY 6 HOURS PRN
Status: DISCONTINUED | OUTPATIENT
Start: 2025-07-24 | End: 2025-07-25 | Stop reason: HOSPADM

## 2025-07-24 RX ORDER — MAGNESIUM SULFATE IN WATER 40 MG/ML
2000 INJECTION, SOLUTION INTRAVENOUS PRN
Status: DISCONTINUED | OUTPATIENT
Start: 2025-07-24 | End: 2025-07-25 | Stop reason: HOSPADM

## 2025-07-24 RX ORDER — MIDAZOLAM HYDROCHLORIDE 5 MG/ML
INJECTION, SOLUTION INTRAMUSCULAR; INTRAVENOUS PRN
Status: COMPLETED | OUTPATIENT
Start: 2025-07-24 | End: 2025-07-24

## 2025-07-24 RX ORDER — LIDOCAINE HYDROCHLORIDE 10 MG/ML
INJECTION, SOLUTION INFILTRATION; PERINEURAL PRN
Status: COMPLETED | OUTPATIENT
Start: 2025-07-24 | End: 2025-07-24

## 2025-07-24 RX ORDER — POTASSIUM CHLORIDE 1500 MG/1
40 TABLET, EXTENDED RELEASE ORAL PRN
Status: DISCONTINUED | OUTPATIENT
Start: 2025-07-24 | End: 2025-07-25 | Stop reason: HOSPADM

## 2025-07-24 RX ORDER — SODIUM CHLORIDE 9 MG/ML
INJECTION, SOLUTION INTRAVENOUS PRN
Status: DISCONTINUED | OUTPATIENT
Start: 2025-07-24 | End: 2025-07-25 | Stop reason: HOSPADM

## 2025-07-24 RX ORDER — DEXTROSE MONOHYDRATE AND SODIUM CHLORIDE 5; .45 G/100ML; G/100ML
INJECTION, SOLUTION INTRAVENOUS CONTINUOUS
Status: DISCONTINUED | OUTPATIENT
Start: 2025-07-24 | End: 2025-07-25 | Stop reason: HOSPADM

## 2025-07-24 RX ORDER — ONDANSETRON 4 MG/1
4 TABLET, ORALLY DISINTEGRATING ORAL EVERY 8 HOURS PRN
Status: DISCONTINUED | OUTPATIENT
Start: 2025-07-24 | End: 2025-07-25 | Stop reason: HOSPADM

## 2025-07-24 RX ORDER — POLYETHYLENE GLYCOL 3350 17 G/17G
17 POWDER, FOR SOLUTION ORAL DAILY PRN
Status: DISCONTINUED | OUTPATIENT
Start: 2025-07-24 | End: 2025-07-25 | Stop reason: HOSPADM

## 2025-07-24 RX ORDER — ENOXAPARIN SODIUM 100 MG/ML
40 INJECTION SUBCUTANEOUS DAILY
Status: DISCONTINUED | OUTPATIENT
Start: 2025-07-24 | End: 2025-07-25 | Stop reason: HOSPADM

## 2025-07-24 RX ORDER — SODIUM CHLORIDE 0.9 % (FLUSH) 0.9 %
5-40 SYRINGE (ML) INJECTION PRN
Status: DISCONTINUED | OUTPATIENT
Start: 2025-07-24 | End: 2025-07-25 | Stop reason: HOSPADM

## 2025-07-24 RX ORDER — ACETAMINOPHEN 325 MG/1
650 TABLET ORAL EVERY 6 HOURS PRN
Status: DISCONTINUED | OUTPATIENT
Start: 2025-07-24 | End: 2025-07-25 | Stop reason: HOSPADM

## 2025-07-24 RX ORDER — POTASSIUM CHLORIDE 7.45 MG/ML
10 INJECTION INTRAVENOUS PRN
Status: DISCONTINUED | OUTPATIENT
Start: 2025-07-24 | End: 2025-07-25 | Stop reason: HOSPADM

## 2025-07-24 RX ORDER — SODIUM CHLORIDE, SODIUM LACTATE, POTASSIUM CHLORIDE, CALCIUM CHLORIDE 600; 310; 30; 20 MG/100ML; MG/100ML; MG/100ML; MG/100ML
INJECTION, SOLUTION INTRAVENOUS CONTINUOUS
Status: DISCONTINUED | OUTPATIENT
Start: 2025-07-24 | End: 2025-07-24

## 2025-07-24 RX ORDER — ACETAMINOPHEN 650 MG/1
650 SUPPOSITORY RECTAL EVERY 6 HOURS PRN
Status: DISCONTINUED | OUTPATIENT
Start: 2025-07-24 | End: 2025-07-25 | Stop reason: HOSPADM

## 2025-07-24 RX ADMIN — DEXTROSE AND SODIUM CHLORIDE: 5; .45 INJECTION, SOLUTION INTRAVENOUS at 02:13

## 2025-07-24 RX ADMIN — Medication 3 MG: at 22:47

## 2025-07-24 RX ADMIN — SODIUM CHLORIDE, SODIUM LACTATE, POTASSIUM CHLORIDE, AND CALCIUM CHLORIDE: .6; .31; .03; .02 INJECTION, SOLUTION INTRAVENOUS at 00:27

## 2025-07-24 RX ADMIN — HYDROMORPHONE HYDROCHLORIDE 0.5 MG: 1 INJECTION, SOLUTION INTRAMUSCULAR; INTRAVENOUS; SUBCUTANEOUS at 00:16

## 2025-07-24 RX ADMIN — LEVOTHYROXINE SODIUM 175 MCG: 0.15 TABLET ORAL at 05:32

## 2025-07-24 RX ADMIN — Medication 3 MG: at 00:16

## 2025-07-24 RX ADMIN — POLYETHYLENE GLYCOL-3350 AND ELECTROLYTES 4000 ML: 236; 6.74; 5.86; 2.97; 22.74 POWDER, FOR SOLUTION ORAL at 11:54

## 2025-07-24 RX ADMIN — FENTANYL CITRATE 50 MCG: 50 INJECTION, SOLUTION INTRAMUSCULAR; INTRAVENOUS at 12:39

## 2025-07-24 RX ADMIN — LIDOCAINE HYDROCHLORIDE 10 ML: 10 INJECTION, SOLUTION INFILTRATION; PERINEURAL at 12:42

## 2025-07-24 RX ADMIN — HYDROMORPHONE HYDROCHLORIDE 0.5 MG: 1 INJECTION, SOLUTION INTRAMUSCULAR; INTRAVENOUS; SUBCUTANEOUS at 18:16

## 2025-07-24 RX ADMIN — MIDAZOLAM HYDROCHLORIDE 1 MG: 5 INJECTION, SOLUTION INTRAMUSCULAR; INTRAVENOUS at 12:39

## 2025-07-24 RX ADMIN — SODIUM CHLORIDE, PRESERVATIVE FREE 10 ML: 5 INJECTION INTRAVENOUS at 08:03

## 2025-07-24 ASSESSMENT — PAIN SCALES - GENERAL
PAINLEVEL_OUTOF10: 7
PAINLEVEL_OUTOF10: 0
PAINLEVEL_OUTOF10: 7
PAINLEVEL_OUTOF10: 0
PAINLEVEL_OUTOF10: 3

## 2025-07-24 ASSESSMENT — PAIN DESCRIPTION - ORIENTATION
ORIENTATION: LEFT
ORIENTATION: LEFT;MID

## 2025-07-24 ASSESSMENT — PAIN DESCRIPTION - LOCATION
LOCATION: ABDOMEN
LOCATION: ABDOMEN

## 2025-07-24 ASSESSMENT — PAIN - FUNCTIONAL ASSESSMENT: PAIN_FUNCTIONAL_ASSESSMENT: ACTIVITIES ARE NOT PREVENTED

## 2025-07-24 ASSESSMENT — PAIN SCALES - WONG BAKER
WONGBAKER_NUMERICALRESPONSE: HURTS A LITTLE BIT
WONGBAKER_NUMERICALRESPONSE: NO HURT
WONGBAKER_NUMERICALRESPONSE: NO HURT
WONGBAKER_NUMERICALRESPONSE: HURTS WHOLE LOT

## 2025-07-24 ASSESSMENT — PAIN DESCRIPTION - ONSET: ONSET: GRADUAL

## 2025-07-24 ASSESSMENT — PAIN DESCRIPTION - FREQUENCY: FREQUENCY: INTERMITTENT

## 2025-07-24 ASSESSMENT — PAIN DESCRIPTION - PAIN TYPE: TYPE: ACUTE PAIN

## 2025-07-24 ASSESSMENT — PAIN DESCRIPTION - DESCRIPTORS
DESCRIPTORS: ACHING
DESCRIPTORS: DULL

## 2025-07-24 NOTE — CONSULTS
General Surgery Consult Note      Yamilka Veliz   : 1975 MRN: [id number]  Date of Admission: 2025  Admitting Physician:Presley Cueva DO  Primary Care Physician: Veto Maravilla MD  Consulting Physician: Dr. cueva    Reason for Consult: peritoneal mass    Chief complaint:    Chief Complaint   Patient presents with    Vomiting    Abdominal Pain       Diagnosis Present on Admission:   Abdominal pain      History of Present Illness  Yamilka Veliz is a 50 y.o. female admitted on 2025 for mild infectious/inflammatory colitis/diverticulitis involving the transverse colon.  She had similar symptoms about 3 weeks ago.  Complains of nausea, emesis.  This has been going on for the last 6 months.  States she vomits at least once a week.  She reports abdominal pain secondary to continued retching.  Was treated conservatively at that time with antibiotics and outpatient Levaquin and Flagyl.  There is also a stable 3.7 cm intraperitoneal mass adjacent to the anterior abdominal wall.  She has a history of left ovarian cancer back in .  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    Thyroid disease      Past Surgical History:   Procedure Laterality Date    BUNIONECTOMY  2012    left    CARPAL TUNNEL RELEASE  2007    bilateral    OVARY REMOVAL       Family History   Problem Relation Age of Onset    Diabetes Mother     High Cholesterol Mother     High Blood Pressure Mother     Cancer Mother     Diabetes Father     High Cholesterol Father     High Blood Pressure Father      Social History     Socioeconomic History    Marital status:      Spouse name: Not on file    Number of children: Not on file    Years of education: Not on file    Highest education level: Not on file   Occupational History    Not on file   Tobacco Use    Smoking status: Never     etiology but differential could be colitis versus hyperemesis cannabinoid syndrome.  She has stable peritoneal mass of unclear etiology but does not appear to be contributing to her acute symptoms      Plan    1.  Peritoneal mass  Given history of left ovarian cancer we will ask IR to evaluate for biopsy  Pending results may need outpatient evaluation by GYN oncology    2.  transverse colitis  Agree with GI.  Unusual location for diverticulitis  GI planning EGD and colonoscopy tomorrow.  If unremarkable then consider hyperemesis cannabinoid syndrome as possible etiology for cyclical nausea/emesis.  Recommend cessation either way        Gerson Douglass MD  [phone]  Electronically signed 7/24/2025 at 10:09 AM

## 2025-07-24 NOTE — CARE COORDINATION
Case Management Assessment  Initial Evaluation    Date/Time of Evaluation: 7/24/2025 9:59 AM  Assessment Completed by: Chaya Barker RN    If patient is discharged prior to next notation, then this note serves as note for discharge by case management.    Patient Name: Yamilka Veliz                   YOB: 1975  Diagnosis: Abdominal pain [R10.9]                   Date / Time: 7/23/2025  4:32 PM    Patient Admission Status: Observation   Readmission Risk (Low < 19, Mod (19-27), High > 27): Readmission Risk Score: 3.2    Current PCP: Veto Maravilla MD  PCP verified by CM? Yes    Chart Reviewed: Yes      History Provided by: Medical Record  Patient Orientation: Alert and Oriented    Patient Cognition: Alert    Hospitalization in the last 30 days (Readmission):  Yes    If yes, Readmission Assessment in  Navigator will be completed.    Advance Directives:      Code Status: Full Code   Patient's Primary Decision Maker is: Legal Next of Kin      Discharge Planning:    Patient lives with: (P) Spouse/Significant Other Type of Home: (P) House  Primary Care Giver: Self  Patient Support Systems include: Spouse/Significant Other   Current Financial resources: (P) Medicaid  Current community resources: (P) None  Current services prior to admission: (P) None            Current DME:              Type of Home Care services:  (P) None    ADLS  Prior functional level: (P) Independent in ADLs/IADLs  Current functional level: (P) Independent in ADLs/IADLs    PT AM-PAC:   /24  OT AM-PAC:   /24    Family can provide assistance at DC: (P) Yes  Would you like Case Management to discuss the discharge plan with any other family members/significant others, and if so, who? (P) No  Plans to Return to Present Housing: (P) Yes  Other Identified Issues/Barriers to RETURNING to current housing: none  Potential Assistance needed at discharge: (P) N/A            Potential DME:    Patient expects to discharge to: (P) House  Plan for  Chief Complaint   Patient presents with    Cough     Chronic cough        1. \"Have you been to the ER, urgent care clinic since your last visit? Hospitalized since your last visit? \" No    2. \"Have you seen or consulted any other health care providers outside of the [address] [place] [address] since your last visit? \" No     3. For patients aged 39-70: Has the patient had a colonoscopy / FIT/ Cologuard? No      If the patient is female:    4. For patients aged 41-77: Has the patient had a mammogram within the past 2 years? NA - based on age or sex      11. For patients aged 21-65: Has the patient had a pap smear?  NA - based on age or sex

## 2025-07-24 NOTE — ED NOTES
ED to Inpatient Handoff SBAR    Patient Name: Yamilka Veliz   :  1975  50 y.o.   MRN:  [id number]  Preferred Name    ED Room #:    Family/Caregiver Present no     Chief Complaint Vomiting and Abdominal Pain       Restraints no   Sitter no   Sepsis Risk Score    Isolation No active isolations   Fall Risk Assessment Presents to emergency department  because of falls (Syncope, seizure, or loss of consciousness): No, Age > 70: No, Altered Mental Status, Intoxication with alcohol or substance confusion (Disorientation, impaired judgment, poor safety awaremess, or inability to follow instructions): No, Impaired Mobility: Ambulates or transfers with assistive devices or assistance; Unable to ambulate or transer.: No, Nursing Judgement: No     Situation  Code Status: Prior No additional code details.    Allergies: Cefdinir  Weight: Patient Vitals for the past 96 hrs (Last 3 readings):   Weight   25 1639 82.6 kg (182 lb 1.6 oz)     Arrived from: home  Hospital Problem/Diagnosis:  Principal Problem:    Abdominal pain  Resolved Problems:    * No resolved hospital problems. *    Imaging:   CT ABDOMEN PELVIS W IV CONTRAST Additional Contrast? None   Final Result   1.  Mild infectious/inflammatory colitis/diverticulitis involving the transverse colon, mildly improved. Extensive stable 3.7 cm soft tissue mass along the anterior abdominal wall.   2.  Resolved right adnexal cyst.      Electronically signed by Josiah James MD        Abnormal labs:   Abnormal Labs Reviewed   CBC WITH AUTO DIFFERENTIAL - Abnormal; Notable for the following components:       Result Value    WBC 13.3 (*)     RBC 5.93 (*)     Hemoglobin 17.4 (*)     Hematocrit 52.2 (*)     Neutrophils Absolute 10.67 (*)     All other components within normal limits   BASIC METABOLIC PANEL - Abnormal; Notable for the following components:    Glucose 100 (*)     All other components within normal limits   URINALYSIS WITH MICROSCOPIC - Abnormal; Notable

## 2025-07-24 NOTE — PRE SEDATION
Sedation Pre-Procedure Note    Patient Name: Yamilka Veliz   YOB: 1975  Room/Bed: 3205/3205-01  Medical Record Number: [id number]  Date: 7/24/2025   Time: 12:34 PM       Indication:  H/O gynecologic malignancy    Consent: I have discussed with the patient and/or the patient representative the indication, alternatives, and the possible risks and/or complications of the planned procedure and the anesthesia methods. The patient and/or patient representative appear to understand and agree to proceed.    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    Past Surgical History:   has a past surgical history that includes Ovary removal; Carpal tunnel release (2007); and Bunionectomy (12/06/2012).    Medications:   Scheduled Meds:    sodium chloride flush  5-40 mL IntraVENous 2 times per day    [Held by provider] enoxaparin  40 mg SubCUTAneous Daily    levothyroxine  175 mcg Oral Daily     Continuous Infusions:    sodium chloride      dextrose 5 % and 0.45 % NaCl 100 mL/hr at 07/24/25 0213     PRN Meds: sodium chloride flush, sodium chloride, potassium chloride **OR** potassium alternative oral replacement **OR** potassium chloride, magnesium sulfate, ondansetron **OR** ondansetron, polyethylene glycol, acetaminophen **OR** acetaminophen, HYDROmorphone, melatonin  Home Meds:   Prior to Admission medications    Medication Sig Start Date End Date Taking? Authorizing Provider   levoFLOXacin (LEVAQUIN) 750 MG tablet Take 1 tablet by mouth daily for 6 days 7/19/25 7/25/25 Yes Chioma Ayala APRN - CNP   metroNIDAZOLE (FLAGYL) 500 MG tablet Take 1 tablet by mouth 3 times daily for 6 days 7/19/25 7/25/25 Yes Chioma Ayala APRN - CNP   levothyroxine (SYNTHROID) 175 MCG

## 2025-07-24 NOTE — PLAN OF CARE
Problem: Pain  Goal: Verbalizes/displays adequate comfort level or baseline comfort level  Outcome: Progressing   Patient displays adequate comfort levels

## 2025-07-24 NOTE — PROGRESS NOTES
Hospitalist Progress Note      PCP: Veto Maravilla MD    Date of Admission: 7/23/2025    Chief Complaint: n/v    Hospital Course: Patient came to the emergency department today because of acute nausea vomiting up to 8 times in the ED today. Also had an episode of diarrhea without vomiting. Patient was admitted to our hospital last week with similar symptoms and was diagnosed with diverticulitis.  She almost completed antibiotic course. Admitted for further workup.     Subjective: Pt laying in bed, s/p abd mass biopsy with IR. Pt tolerated procedure well. Denies abd pain, n/v. No diarrhea. Plan for EGD and colonoscopy tomorrow. Family at bedside. Reviewed POC, denies needs.      Assessment/Plan:    Intractable N/V  -resolved now  -continue prn antiemetics  -GI consult- plan for egd and c scope in am    inflammatory fat stranding transverse colon   -improved per CT report  -pt as taken 6/7 days of antibiotics levaquin and flagy. GI concerned this could be IBD. Scopes tomorrow    Abdominal wall mass  -general surgery consult, recommend IR biopsy  -7/24/25 IR biopsy performed    Right ovary lesion  -5.1 x 4.4 cm complex cystic lesion of the right ovary seen on transvaginal ultrasound July 22, 2025 -noted difficult to visualize due to overlying bowel gas and recommendation for 10-week follow-up TV ultrasound   -CT abd performed 7/23/25 showed not right ovary lesion. Pt did start menses 7/23/25    Leukocytosis -suspect related to vomiting and volume depletion   Polycythemia -likely related to volume depletion   Hypothyroidism -on Synthroid   History of left ovarian granulosa cell tumor status post oophorectomy in 2004   Starvation ketosis-2/2 to vomiting. Continue D5 IVF.     Active Hospital Problems    Diagnosis     Nausea and vomiting [R11.2]     Abdominal pain [R10.9]        Medications:  Reviewed    Infusion Medications    sodium chloride      dextrose 5 % and 0.45 % NaCl 100 mL/hr at 07/24/25 0213     Scheduled  Medications    sodium chloride flush  5-40 mL IntraVENous 2 times per day    [Held by provider] enoxaparin  40 mg SubCUTAneous Daily    levothyroxine  175 mcg Oral Daily     PRN Meds: sodium chloride flush, sodium chloride, potassium chloride **OR** potassium alternative oral replacement **OR** potassium chloride, magnesium sulfate, ondansetron **OR** ondansetron, polyethylene glycol, acetaminophen **OR** acetaminophen, HYDROmorphone, melatonin      Intake/Output Summary (Last 24 hours) at 7/24/2025 1414  Last data filed at 7/24/2025 1258  Gross per 24 hour   Intake 120 ml   Output 1 ml   Net 119 ml       Physical Exam Performed:    /86   Pulse 67   Temp 98.1 °F (36.7 °C) (Oral)   Resp 18   Ht 1.549 m (5' 1\")   Wt 82.6 kg (182 lb 1.6 oz)   SpO2 97%   BMI 34.41 kg/m²     General appearance: No apparent distress, appears stated age and cooperative.  HEENT: Pupils equal, round. Conjunctivae/corneas clear.  Neck: Supple, with full range of motion. No jugular venous distention. Trachea midline.  Respiratory:  Normal respiratory effort. Clear to auscultation, bilaterally without Rales/Wheezes/Rhonchi.  Cardiovascular: Regular rate and rhythm with normal S1/S2 without murmurs, rubs or gallops.  Abdomen: Soft, non-tender, non-distended with normal bowel sounds.  Musculoskeletal: No clubbing, cyanosis or edema bilaterally.  Full range of motion without deformity.  Skin: Skin color, texture, turgor normal.  No rashes or lesions.  Neurologic:  Face symmetrical, speech clear, moves all 4 extremities, sensations are intact bilaterally   Psychiatric: Alert and oriented, thought content appropriate, normal insight  Capillary Refill: Brisk,< 3 seconds   Peripheral Pulses: +2 palpable, equal bilaterally       Labs:   Recent Labs     07/23/25  1645 07/24/25  0650   WBC 13.3* 7.2   HGB 17.4* 14.0   HCT 52.2* 42.8    195     Recent Labs     07/23/25  1645 07/24/25  0650    139   K 3.8 3.6    103   CO2 23

## 2025-07-24 NOTE — PROGRESS NOTES
4 Eyes Skin Assessment     NAME:  Yamilka Veliz  YOB: 1975  MEDICAL RECORD NUMBER:  [id number]    The patient is being assessed for  Admission    I agree that at least one RN has performed a thorough Head to Toe Skin Assessment on the patient. ALL assessment sites listed below have been assessed.      Areas assessed by both nurses:    Head, Face, Ears, Shoulders, Back, Chest, Arms, Elbows, Hands, Sacrum. Buttock, Coccyx, Ischium, and Legs. Feet and Heels        Does the Patient have a Wound? No noted wound(s)       Sergio Prevention initiated by RN: Yes  Wound Care Orders initiated by RN: No    Pressure Injury (Stage 3,4, Unstageable, DTI, NWPT, and Complex wounds) if present, place Wound referral order by RN under : No    New Ostomies, if present place, Ostomy referral order under : No     Nurse 1 eSignature: Electronically signed by Salome Dumont RN on 7/23/25 at 10:12 PM EDT    **SHARE this note so that the co-signing nurse can place an eSignature**    Nurse 2 eSignature: Electronically signed by Marco Helton RN on 7/24/25 at 1:33 AM EDT

## 2025-07-24 NOTE — CONSULTS
Gastrointestinal Consult Note    Patient: Yamilka Veliz CSN: [id number]     YOB: 1975  Age: 50 y.o.  Sex: female    Unit: [address] Room/Bed: 3205/3205-01 Location: [facility] [place]     Admitting Physician: JACE PARSON    Date of  Admission: 7/23/2025   Admission type: Emergency  Primary Care Physician: Veto Maravilla MD          Referring Physician: [unfilled]    Chief Complaint: Abdominal pain  Consult Date: 7/24/2025     Subjective:     History of Present Illness: Yamilka Veliz is a 50 y.o. female who is seen at the request of JACE PARSON for abdominal pain.    This a very pleasant 50-year-old female who was admitted to the hospital because she was having problems with abdominal pain    Was also having problems with nausea and vomiting and has this nausea vomiting and abdominal pain that brought her into the hospital    Patient was here last week and she had this transverse colon inflammation    It was thought that maybe she had diverticulitis at that time she was treated with Levaquin and Flagyl    But she still continues to have this nausea vomiting and pain    Patient also was found to recently have a soft tissue anterior abdominal mass and a new mass on her right    Patient was seen in consultation by gynecology services  We have been asked to see and evaluate this patient because of her abdominal pain nausea vomit    Past Medical History:   Diagnosis Date    Arthritis     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 Vasculature: Mild atherosclerosis. Circumaortic left renal vein, normal anatomic variant. Lymph nodes: No lymphadenopathy is appreciated. Osseous structures: No suspicious abnormality.     Moderate colonic diverticulosis, with mild fat stranding along the transverse colon suspicious for acute uncomplicated diverticulitis. 3.5 x 2.0 cm anterior intraperitoneal mass, increased. This is indeterminate, however given history of ovarian malignancy, metastasis is not excluded. Recommend tissue sampling. Previously described indeterminant hepatic low-density lesion is compatible with a benign hemangioma. 4 cm right adnexal region low-density lesion, likely ovarian cyst. Given size and history of ovarian malignancy, recommend follow-up pelvic ultrasound in 6-12 weeks. Electronically signed by Jose Eubanks MD      Labs Reviewed:     BUN 8 creatinine 0.7 liver enzymes normal  This morning white count 7.2 hemoglobin 14.0 platelet count 195    It appears that the patient may have a urinary tract infection she has increased WBCs and increased RBCs she has bacteria and trace leuk esterase in her urine      Assessment/Plan:     Principal Problem:    Abdominal pain  Resolved Problems:    * No resolved hospital problems. *  Abnormal CT scan  -It is definitely hard to know what this inflammation in the transverse colon may  -This is an unusual place to have diverticulitis  -Although it can happen  -Patient has received a significant amount of antibiotic  -This patient most likely will need an EGD and a colonoscopy  -We gave consideration to doing a rapid prep today and see if we get the patient in but by the time we would give her the prep get it down make her n.p.o. and then get her to endoscopy it would be at 3 PM and therefore it would be too late of a cut off  -Will plan on giving the patient a prep today and doing an upper and lower endoscopy tomorrow    Thank you for this kind

## 2025-07-24 NOTE — H&P
levothyroxine (SYNTHROID) 175 MCG tablet Take 1 tablet by mouth Daily   Yes Provider, MD Fadumo   dicyclomine (BENTYL) 20 MG tablet Take 1 tablet by mouth 4 times daily for 10 days 8/30/24 9/9/24  Reece Maza MD       Physical Exam:    Physical Exam  Constitutional:       General: She is not in acute distress.     Appearance: Normal appearance.   Eyes:      General: No scleral icterus.     Conjunctiva/sclera: Conjunctivae normal.   Cardiovascular:      Rate and Rhythm: Normal rate and regular rhythm.      Pulses: Normal pulses.      Heart sounds: Normal heart sounds.   Pulmonary:      Effort: Pulmonary effort is normal. No respiratory distress.      Breath sounds: Normal breath sounds.   Abdominal:      General: There is no distension.      Palpations: Abdomen is soft.      Tenderness: There is no abdominal tenderness.   Musculoskeletal:         General: No swelling.   Skin:     General: Skin is warm and dry.   Neurological:      General: No focal deficit present.      Mental Status: She is alert and oriented to person, place, and time.   Psychiatric:         Mood and Affect: Mood normal.         Behavior: Behavior normal.          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  Allergies:   Allergies   Allergen Reactions    Cefdinir Swelling     Throat swelling     Fam HX:  family history includes Cancer in her mother; Diabetes in her father and mother; High Blood Pressure in her father and mother; High Cholesterol in her father and mother.  Soc HX:   Social History     Socioeconomic History    Marital status:    Tobacco Use    Smoking status: Never   Substance and Sexual Activity    Alcohol use: Yes     Alcohol/week:  Normal appendix. Abdominal wall: Normal. Peritoneal cavity: No free or loculated fluid collections are seen. 3.7 x 1.9 cm soft tissue mass along the anterior abdominal wall deep to the umbilicus is stable. Vasculature: No aneurysm. Lymph nodes: No lymphadenopathy is appreciated. Osseous structures: No suspicious abnormality.     1.  Mild infectious/inflammatory colitis/diverticulitis involving the transverse colon, mildly improved. Extensive stable 3.7 cm soft tissue mass along the anterior abdominal wall. 2.  Resolved right adnexal cyst. Electronically signed by Josiah James MD    US PELVIS COMPLETE NON-OB TRANSABDOMINAL AND TRANSVAGINAL  Result Date: 7/22/2025  Transabdominal and endovaginal Pelvic ultrasound History: Right ovarian cyst. History of ovarian cancer. COMMENTS: Uterus: 8.5 x 5.4 x 4.9 cm. 1.8 cm and 1.0 cm fundal leiomyomas. Endometrium: 11 mm. Ovaries: 5.1 x 4.4 cm complex cystic lesion in the right ovary is difficult to visualize due to overlying bowel gas. This corresponds to the lesion seen on the CT of 7/17/2025. The left ovary is surgically absent. Free pelvic fluid: None     Complex cystic lesion in the right ovary may represent a hemorrhagic cyst. 10-week follow-up ultrasound is indicated to document resolution. Small uterine fibroids. Status post left oophorectomy. Electronically signed by Felix Fox    CT ABDOMEN PELVIS W IV CONTRAST Additional Contrast? None  Result Date: 7/17/2025  EXAM: CT ABDOMEN PELVIS W IV CONTRAST INDICATION: upper abdominal pain nausea, vomiting. COMPARISON: 8/30/2024 TECHNIQUE: Standard per department protocol. Coronal and sagittal reconstructions were obtained. Up-to-date CT equipment and radiation dose reduction techniques were employed. CONTRAST: Isovue-370 intravenous FINDINGS: : No additional findings. ABDOMEN/PELVIS: Lower chest: Unremarkable. Liver and biliary system: 1.4 cm low-density lesion in the right hepatic lobe with discontinuous  Regular: Consistent Carbohydrate {No Snacks} (CSTCHO) 1500mL Fluid Restriction ([id number]) (11-21-23 @ 15:14) [Active]

## 2025-07-24 NOTE — BRIEF OP NOTE
Brief Postoperative Note    Yamilka Veliz  YOB: 1975  [id number]    Pre-operative Diagnosis: H/O ovarian malignancy    Post-operative Diagnosis: Same    Procedure: CT Guided peritneal biospy    Anesthesia: Local   IV  Versed and Fentanyl  An immediate re-assessment was completed prior to sedation, and it is determined to be safe to proceed.    Surgeons/Assistants: Derci Kelley MD    Estimated Blood Loss: Minimal  < 1cc  Flowable D- stat dry utilized    Complications: none    Specimens: were obtained  3 core specimens with 18 G Automated biopsy gun      Deric Kelley MD MD  7/24/2025

## 2025-07-24 NOTE — PLAN OF CARE
Patient admitted to room 3205 from ED. Patient oriented to room, call light, bed rails, phone, lights and bathroom. Patient instructed about the schedule of the day including: vital sign frequency, lab draws, possible tests, frequency of MD and staff rounds, daily weights, I &O's and prescribed diet. Telemetry box in place, patient aware of placement and reason. Bed locked, in lowest position, side rails up 2/4 , call light within reach.

## 2025-07-25 ENCOUNTER — ANESTHESIA (OUTPATIENT)
Age: 50
End: 2025-07-25
Payer: MEDICAID

## 2025-07-25 VITALS
TEMPERATURE: 97.7 F | HEART RATE: 53 BPM | RESPIRATION RATE: 18 BRPM | BODY MASS INDEX: 35.87 KG/M2 | DIASTOLIC BLOOD PRESSURE: 86 MMHG | OXYGEN SATURATION: 100 % | HEIGHT: 61 IN | SYSTOLIC BLOOD PRESSURE: 126 MMHG | WEIGHT: 190 LBS

## 2025-07-25 LAB
ANION GAP SERPL CALCULATED.3IONS-SCNC: 12 MMOL/L (ref 3–16)
BASOPHILS # BLD: 0.02 K/UL (ref 0–0.2)
BASOPHILS NFR BLD: 0 %
BUN SERPL-MCNC: 5 MG/DL (ref 7–20)
CALCIUM SERPL-MCNC: 8.4 MG/DL (ref 8.3–10.6)
CHLORIDE SERPL-SCNC: 107 MMOL/L (ref 99–110)
CO2 SERPL-SCNC: 22 MMOL/L (ref 21–32)
CREAT SERPL-MCNC: 0.5 MG/DL (ref 0.5–1)
EOSINOPHIL # BLD: 0.19 K/UL (ref 0–0.6)
EOSINOPHILS RELATIVE PERCENT: 3 %
ERYTHROCYTE [DISTWIDTH] IN BLOOD BY AUTOMATED COUNT: 12.8 % (ref 12.4–15.4)
GFR, ESTIMATED: >90 ML/MIN/1.73M2
GLUCOSE SERPL-MCNC: 93 MG/DL (ref 70–99)
HCG, PREGNANCY URINE (POC): NEGATIVE
HCT VFR BLD AUTO: 41.4 % (ref 36–48)
HGB BLD-MCNC: 14 G/DL (ref 12–16)
IMM GRANULOCYTES # BLD AUTO: 0.02 K/UL (ref 0–0.5)
IMM GRANULOCYTES NFR BLD: 0 %
LYMPHOCYTES NFR BLD: 1.88 K/UL (ref 1–5.1)
LYMPHOCYTES RELATIVE PERCENT: 28 %
MCH RBC QN AUTO: 29.9 PG (ref 26–34)
MCHC RBC AUTO-ENTMCNC: 33.8 G/DL (ref 31–36)
MCV RBC AUTO: 88.5 FL (ref 80–100)
MONOCYTES NFR BLD: 0.49 K/UL (ref 0–1.3)
MONOCYTES NFR BLD: 7 %
NEUTROPHILS NFR BLD: 61 %
NEUTS SEG NFR BLD: 4.12 K/UL (ref 1.7–7.7)
PLATELET # BLD AUTO: 181 K/UL (ref 135–450)
PMV BLD AUTO: 10 FL
POTASSIUM SERPL-SCNC: 3.9 MMOL/L (ref 3.5–5.1)
RBC # BLD AUTO: 4.68 M/UL (ref 4–5.2)
SODIUM SERPL-SCNC: 141 MMOL/L (ref 136–145)
WBC OTHER # BLD: 6.7 K/UL (ref 4–11)

## 2025-07-25 PROCEDURE — 81025 URINE PREGNANCY TEST: CPT

## 2025-07-25 PROCEDURE — 2580000003 HC RX 258: Performed by: STUDENT IN AN ORGANIZED HEALTH CARE EDUCATION/TRAINING PROGRAM

## 2025-07-25 PROCEDURE — 6370000000 HC RX 637 (ALT 250 FOR IP): Performed by: STUDENT IN AN ORGANIZED HEALTH CARE EDUCATION/TRAINING PROGRAM

## 2025-07-25 PROCEDURE — G0378 HOSPITAL OBSERVATION PER HR: HCPCS

## 2025-07-25 PROCEDURE — 7100000011 HC PHASE II RECOVERY - ADDTL 15 MIN: Performed by: INTERNAL MEDICINE

## 2025-07-25 PROCEDURE — 6370000000 HC RX 637 (ALT 250 FOR IP): Performed by: NURSE PRACTITIONER

## 2025-07-25 PROCEDURE — 7100000010 HC PHASE II RECOVERY - FIRST 15 MIN: Performed by: INTERNAL MEDICINE

## 2025-07-25 PROCEDURE — 6360000002 HC RX W HCPCS: Performed by: NURSE ANESTHETIST, CERTIFIED REGISTERED

## 2025-07-25 PROCEDURE — 99232 SBSQ HOSP IP/OBS MODERATE 35: CPT | Performed by: SURGERY

## 2025-07-25 PROCEDURE — 96361 HYDRATE IV INFUSION ADD-ON: CPT

## 2025-07-25 PROCEDURE — 3700000000 HC ANESTHESIA ATTENDED CARE: Performed by: INTERNAL MEDICINE

## 2025-07-25 PROCEDURE — 3609010600 HC COLONOSCOPY POLYPECTOMY SNARE/COLD BIOPSY: Performed by: INTERNAL MEDICINE

## 2025-07-25 PROCEDURE — 88305 TISSUE EXAM BY PATHOLOGIST: CPT

## 2025-07-25 PROCEDURE — 3609012400 HC EGD TRANSORAL BIOPSY SINGLE/MULTIPLE: Performed by: INTERNAL MEDICINE

## 2025-07-25 PROCEDURE — 2709999900 HC NON-CHARGEABLE SUPPLY: Performed by: INTERNAL MEDICINE

## 2025-07-25 PROCEDURE — 3700000001 HC ADD 15 MINUTES (ANESTHESIA): Performed by: INTERNAL MEDICINE

## 2025-07-25 PROCEDURE — 80048 BASIC METABOLIC PNL TOTAL CA: CPT

## 2025-07-25 PROCEDURE — 85025 COMPLETE CBC W/AUTO DIFF WBC: CPT

## 2025-07-25 PROCEDURE — 36415 COLL VENOUS BLD VENIPUNCTURE: CPT

## 2025-07-25 RX ORDER — PROPOFOL 10 MG/ML
INJECTION, EMULSION INTRAVENOUS
Status: DISCONTINUED | OUTPATIENT
Start: 2025-07-25 | End: 2025-07-25 | Stop reason: SDUPTHER

## 2025-07-25 RX ORDER — PANTOPRAZOLE SODIUM 40 MG/1
40 TABLET, DELAYED RELEASE ORAL
Qty: 30 TABLET | Refills: 0 | Status: SHIPPED | OUTPATIENT
Start: 2025-07-25

## 2025-07-25 RX ORDER — PANTOPRAZOLE SODIUM 40 MG/1
40 TABLET, DELAYED RELEASE ORAL
Status: DISCONTINUED | OUTPATIENT
Start: 2025-07-25 | End: 2025-07-25 | Stop reason: HOSPADM

## 2025-07-25 RX ORDER — SODIUM CHLORIDE 0.9 % (FLUSH) 0.9 %
5-40 SYRINGE (ML) INJECTION EVERY 12 HOURS SCHEDULED
Status: DISCONTINUED | OUTPATIENT
Start: 2025-07-25 | End: 2025-07-25 | Stop reason: HOSPADM

## 2025-07-25 RX ORDER — GLYCOPYRROLATE 0.2 MG/ML
INJECTION INTRAMUSCULAR; INTRAVENOUS
Status: DISCONTINUED | OUTPATIENT
Start: 2025-07-25 | End: 2025-07-25 | Stop reason: SDUPTHER

## 2025-07-25 RX ORDER — PHENYLEPHRINE HCL IN 0.9% NACL 1 MG/10 ML
SYRINGE (ML) INTRAVENOUS
Status: DISCONTINUED | OUTPATIENT
Start: 2025-07-25 | End: 2025-07-25 | Stop reason: SDUPTHER

## 2025-07-25 RX ORDER — ONDANSETRON 4 MG/1
4 TABLET, ORALLY DISINTEGRATING ORAL 3 TIMES DAILY PRN
Qty: 21 TABLET | Refills: 0 | Status: SHIPPED | OUTPATIENT
Start: 2025-07-25

## 2025-07-25 RX ORDER — SODIUM CHLORIDE 0.9 % (FLUSH) 0.9 %
5-40 SYRINGE (ML) INJECTION PRN
Status: DISCONTINUED | OUTPATIENT
Start: 2025-07-25 | End: 2025-07-25 | Stop reason: HOSPADM

## 2025-07-25 RX ORDER — SODIUM CHLORIDE 9 MG/ML
INJECTION, SOLUTION INTRAVENOUS PRN
Status: DISCONTINUED | OUTPATIENT
Start: 2025-07-25 | End: 2025-07-25 | Stop reason: HOSPADM

## 2025-07-25 RX ORDER — ONDANSETRON 2 MG/ML
4 INJECTION INTRAMUSCULAR; INTRAVENOUS
Status: DISCONTINUED | OUTPATIENT
Start: 2025-07-25 | End: 2025-07-25 | Stop reason: HOSPADM

## 2025-07-25 RX ORDER — ONDANSETRON 2 MG/ML
INJECTION INTRAMUSCULAR; INTRAVENOUS
Status: DISCONTINUED | OUTPATIENT
Start: 2025-07-25 | End: 2025-07-25 | Stop reason: SDUPTHER

## 2025-07-25 RX ORDER — LIDOCAINE HYDROCHLORIDE 20 MG/ML
INJECTION, SOLUTION EPIDURAL; INFILTRATION; INTRACAUDAL; PERINEURAL
Status: DISCONTINUED | OUTPATIENT
Start: 2025-07-25 | End: 2025-07-25 | Stop reason: SDUPTHER

## 2025-07-25 RX ADMIN — PROPOFOL 20 MG: 10 INJECTION, EMULSION INTRAVENOUS at 12:20

## 2025-07-25 RX ADMIN — PANTOPRAZOLE SODIUM 40 MG: 40 TABLET, DELAYED RELEASE ORAL at 15:00

## 2025-07-25 RX ADMIN — LEVOTHYROXINE SODIUM 175 MCG: 0.15 TABLET ORAL at 05:32

## 2025-07-25 RX ADMIN — SODIUM CHLORIDE: 9 INJECTION, SOLUTION INTRAVENOUS at 11:50

## 2025-07-25 RX ADMIN — GLYCOPYRROLATE 0.1 MG: 0.2 INJECTION INTRAMUSCULAR; INTRAVENOUS at 12:13

## 2025-07-25 RX ADMIN — PROPOFOL 20 MG: 10 INJECTION, EMULSION INTRAVENOUS at 12:18

## 2025-07-25 RX ADMIN — Medication 100 MCG: at 12:39

## 2025-07-25 RX ADMIN — PROPOFOL 100 MG: 10 INJECTION, EMULSION INTRAVENOUS at 12:16

## 2025-07-25 RX ADMIN — ONDANSETRON 4 MG: 2 INJECTION, SOLUTION INTRAMUSCULAR; INTRAVENOUS at 12:11

## 2025-07-25 RX ADMIN — DEXTROSE AND SODIUM CHLORIDE: 5; .45 INJECTION, SOLUTION INTRAVENOUS at 09:31

## 2025-07-25 RX ADMIN — PROPOFOL 160 MCG/KG/MIN: 10 INJECTION, EMULSION INTRAVENOUS at 12:17

## 2025-07-25 RX ADMIN — LIDOCAINE HYDROCHLORIDE 100 MG: 20 INJECTION, SOLUTION EPIDURAL; INFILTRATION; INTRACAUDAL; PERINEURAL at 12:16

## 2025-07-25 ASSESSMENT — PAIN - FUNCTIONAL ASSESSMENT: PAIN_FUNCTIONAL_ASSESSMENT: 0-10

## 2025-07-25 ASSESSMENT — PAIN SCALES - GENERAL
PAINLEVEL_OUTOF10: 0

## 2025-07-25 ASSESSMENT — PAIN SCALES - WONG BAKER: WONGBAKER_NUMERICALRESPONSE: NO HURT

## 2025-07-25 NOTE — H&P
Pre-operative History and Physical    Patient: Yamilka Veliz  : 1975  Acct#:     History Obtained From:  patient    HISTORY OF PRESENT ILLNESS:    The patient is a 50 y.o. 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     Current Outpatient Medications on File Prior to Encounter   Medication Sig Dispense Refill    levoFLOXacin (LEVAQUIN) 750 MG tablet Take 1 tablet by mouth daily for 6 days 6 tablet 0    metroNIDAZOLE (FLAGYL) 500 MG tablet Take 1 tablet by mouth 3 times daily for 6 days 18 tablet 0    levothyroxine (SYNTHROID) 175 MCG tablet Take 1 tablet by mouth Daily      dicyclomine (BENTYL) 20 MG tablet Take 1 tablet by mouth 4 times daily for 10 days 40 tablet 0        Allergies:  Cefdinir    History of allergic reaction to anesthesia:  No    PHYSICAL EXAM:      BP 91/74   Pulse 79   Temp 98.1 °F (36.7 °C) (Infrared)   Resp 15   Ht 1.549 m (5' 1\")   Wt 86.2 kg (190 lb)   SpO2 98%   BMI 35.90 kg/m²  I        Heart:  Normal heart sounds  Lungs:  Breath sounds clear  Abdomen: soft, non tender      ASA Grade:  ASA 2 - Patient with mild systemic disease with no functional limitations      ASSESSMENT AND PLAN:    1.  Patient is a 50 y.o. female here for egd/colonoscopy with deep sedation  2.  Procedure options, risks and benefits reviewed with patient.  Patient expresses

## 2025-07-25 NOTE — DISCHARGE SUMMARY
Hospital Medicine Discharge Summary    Patient ID: Yamilka Veliz      Patient's PCP: Veto Maravilla MD    Admit Date: 7/23/2025     Discharge Date: 7/25/2025      Admitting Physician: Presley Cueva DO     Discharge Physician: TRACEY Flores - CNP     Discharge Diagnoses  Intractable nausea vomiting  Gastritis  Inflammatory fat stranding transverse colon  Abdominal wall mass  Right ovarian lesion  Starvation ketosis  Leukocytosis  See comorbidities listed below      Hospital Course: Patient came to the emergency department today because of acute nausea vomiting up to 8 times in the ED today. Also had an episode of diarrhea without vomiting. Patient was admitted to our hospital last week with similar symptoms and was diagnosed with diverticulitis.  She almost completed antibiotic course. Admitted for further workup.     Intractable N/V  -resolved now  -continue prn antiemetics, Zofran ODT prescription provided  -GI consult performed EGD and colonoscopy 7/25/2025.  EGD showed gastritis, colonoscopy showed polyps that were removed.  Patient will need to follow-up with GI for biopsy results.  Start Protonix 40 mg p.o. daily.  Prescription provided.     inflammatory fat stranding transverse colon   -improved per CT report  -pt as taken 6/7 days of antibiotics levaquin and flagy.  Stopped at discharge  -Colonoscopy report not finalized, but no verbal report given to nurse that there was concern for inflammatory bowel disease     Abdominal wall mass  -general surgery consult, recommend IR biopsy  -7/24/25 IR biopsy performed  -Patient to follow-up with Dr. Douglass, surgeon, for biopsy results.  This was discussed with Dr. Douglass as well.     Right ovary lesion  -5.1 x 4.4 cm complex cystic lesion of the right ovary seen on transvaginal ultrasound July 22, 2025 -noted difficult to visualize due to overlying bowel gas and recommendation for 10-week follow-up TV ultrasound   -CT abd performed 7/23/25 showed not

## 2025-07-25 NOTE — ANESTHESIA POSTPROCEDURE EVALUATION
[facility] Department of Anesthesiology  Post-Anesthesia Note       Name:  Yamilka Veliz                                  Age:  50 y.o.  MRN:  [id number]     Last Vitals & Oxygen Saturation: /86   Pulse 53   Temp 97.7 °F (36.5 °C) (Oral)   Resp 18   Ht 1.549 m (5' 1\")   Wt 86.2 kg (190 lb)   SpO2 100%   BMI 35.90 kg/m²   Patient Vitals for the past 4 hrs:   BP Temp Temp src Pulse Resp SpO2 Height Weight   07/25/25 1329 126/86 97.7 °F (36.5 °C) Oral 53 18 100 % -- --   07/25/25 1310 119/80 -- -- 54 (!) 9 99 % -- --   07/25/25 1305 107/69 -- -- 51 15 100 % -- --   07/25/25 1300 94/71 -- -- 65 14 100 % -- --   07/25/25 1259 108/76 97.8 °F (36.6 °C) Infrared 69 17 100 % -- --   07/25/25 1255 108/76 -- -- 68 11 99 % -- --   07/25/25 1252 91/74 -- -- 79 15 98 % -- --   07/25/25 1250 (!) 79/52 -- -- 59 19 97 % -- --   07/25/25 1246 (!) 80/51 98.1 °F (36.7 °C) Infrared 60 12 97 % -- --   07/25/25 1114 (!) 132/93 98.3 °F (36.8 °C) Infrared 55 10 98 % 1.549 m (5' 1\") 86.2 kg (190 lb)       Level of consciousness:  Awake, alert    Respiratory: Respirations easy, no distress. Stable.    Cardiovascular: Hemodynamically stable.    Hydration: Adequate.    PONV: Adequately managed.    Post-op pain: Adequately controlled.    Post-op assessment: Tolerated anesthetic well without complication.    Complications:  None.    Ulises Bucio MD  July 25, 2025   2:07 PM

## 2025-07-25 NOTE — PLAN OF CARE
Problem: Discharge Planning  Goal: Discharge to home or other facility with appropriate resources  7/24/2025 2318 by Salome Dumont, RN  Outcome: Progressing  7/24/2025 2318 by Salome Dumont RN  Outcome: Progressing  Flowsheets (Taken 7/24/2025 2018)  Discharge to home or other facility with appropriate resources:   Identify barriers to discharge with patient and caregiver   Arrange for needed discharge resources and transportation as appropriate   Identify discharge learning needs (meds, wound care, etc)   Arrange for interpreters to assist at discharge as needed   Refer to discharge planning if patient needs post-hospital services based on physician order or complex needs related to functional status, cognitive ability or social support system     Problem: Pain  Goal: Verbalizes/displays adequate comfort level or baseline comfort level  7/25/2025 1033 by Jami Pelletier, RN  Outcome: Progressing  7/24/2025 2318 by Salome Dumont, RN  Outcome: Progressing  7/24/2025 2318 by Salome Dumont, RN  Outcome: Progressing     Problem: Safety - Adult  Goal: Free from fall injury  7/25/2025 1033 by Jami Pelletier, RN  Outcome: Progressing  7/25/2025 1033 by Jami Pelletier, RN  Outcome: Progressing

## 2025-07-25 NOTE — DISCHARGE INSTRUCTIONS
Follow up with GI for polyp biopsy results  Follow up with Dr. Douglass, surgeon, for abdominal mass biopsy results  Follow up with GYN as directed  Follow up with pcp in 1-2 weeks

## 2025-07-25 NOTE — PROGRESS NOTES
General Surgery Progress Note                 PATIENT NAME: Yamilka Veliz   Medical Record Number: [id number]  YOB: 1975       TODAY'S DATE: 7/25/2025      Chief Complaint   Patient presents with    Vomiting    Abdominal Pain         SUBJECTIVE:    Pt reports nausea and emesis resolved.  Denies abdominal pain.       OBJECTIVE:   VITALS:  /89   Pulse 64   Temp 98.2 °F (36.8 °C) (Oral)   Resp 18   Ht 1.549 m (5' 1\")   Wt 82.6 kg (182 lb 1.6 oz)   SpO2 99%   BMI 34.41 kg/m²       INTAKE/OUTPUT:    I/O last 3 completed shifts:  In: 1199.3 [P.O.:120; I.V.:1079.3]  Out: 1 [Blood:1]  No intake/output data recorded.              GENERAL: alert, no distress    LUNGS: Normal rate and effort  HEART: normal rate and regular rhythm  ABDOMEN: soft, non-tender and non-distended  EXTREMITY: no cyanosis, clubbing or edema      DATA:  CBC:   Recent Labs     07/23/25  1645 07/24/25  0650 07/25/25  0434   WBC 13.3* 7.2 6.7   HGB 17.4* 14.0 14.0   HCT 52.2* 42.8 41.4    195 181     BMP:    Recent Labs     07/23/25  1645 07/24/25  0650 07/25/25  0434    139 141   K 3.8 3.6 3.9    103 107   CO2 23 24 22   BUN 10 8 5*   CREATININE 0.7 0.7 0.5   GLUCOSE 100* 104* 93     Hepatic:   Recent Labs     07/23/25  1645 07/24/25  0650   AST 32 19   ALT 38 26   BILITOT 0.7 0.4   ALKPHOS 54 41     Mag:    No results for input(s): \"MG\" in the last 72 hours.   Phos:   No results for input(s): \"PHOS\" in the last 72 hours.   INR:   Recent Labs     07/24/25  1056   INR 1.0             Assessment  Yamilka Veliz is a 50 y.o. female admitted for intractable nausea and emesis.  Mild transverse colitis/thickening.  Symptoms overall improving/resolved      Plan    1.  Peritoneal mass  Await pathology results.  Pending findings may need referral to GYN oncology    2.  Colitis, intractable nausea/emesis  GI planning EGD and colonoscopy today  Continue antibiotics  If unremarkable then consider hyperemesis cannabinoid

## 2025-07-25 NOTE — PLAN OF CARE
Problem: Discharge Planning  Goal: Discharge to home or other facility with appropriate resources  7/24/2025 2318 by Salome Dumont, RN  Outcome: Progressing  7/24/2025 2318 by Salome Dumont, RN  Outcome: Progressing  Flowsheets (Taken 7/24/2025 2018)  Discharge to home or other facility with appropriate resources:   Identify barriers to discharge with patient and caregiver   Arrange for needed discharge resources and transportation as appropriate   Identify discharge learning needs (meds, wound care, etc)   Arrange for interpreters to assist at discharge as needed   Refer to discharge planning if patient needs post-hospital services based on physician order or complex needs related to functional status, cognitive ability or social support system     Problem: Pain  Goal: Verbalizes/displays adequate comfort level or baseline comfort level  7/24/2025 2318 by Salome Dumont, RN  Outcome: Progressing  7/24/2025 2318 by Salome Dumont, RN  Outcome: Progressing  7/24/2025 1410 by Salome Espinal, RN  Outcome: Progressing

## 2025-07-25 NOTE — PLAN OF CARE
Problem: Pain  Goal: Verbalizes/displays adequate comfort level or baseline comfort level  7/24/2025 2318 by Salome Dumont, RN  Outcome: Progressing  7/24/2025 2318 by Salome Dumont, RN  Outcome: Progressing     Problem: Safety - Adult  Goal: Free from fall injury  Outcome: Progressing

## 2025-07-25 NOTE — ANESTHESIA PRE PROCEDURE
Department of Anesthesiology  Preprocedure Note       Name:  Yamilka Veliz   Age:  50 y.o.  :  1975                                          MRN:  [id number]         Date:  2025      Surgeon: Surgeon(s):  Yinka Piper MD    Procedure: Procedure(s):  ESOPHAGOGASTRODUODENOSCOPY  COLONOSCOPY DIAGNOSTIC    Medications prior to admission:   Prior to Admission medications    Medication Sig Start Date End Date Taking? Authorizing Provider   levoFLOXacin (LEVAQUIN) 750 MG tablet Take 1 tablet by mouth daily for 6 days 25 Yes Chioma Ayala APRN - CNP   metroNIDAZOLE (FLAGYL) 500 MG tablet Take 1 tablet by mouth 3 times daily for 6 days 25 Yes Chioma Ayala APRN - CNP   levothyroxine (SYNTHROID) 175 MCG tablet Take 1 tablet by mouth Daily   Yes ProviderFadumo MD   dicyclomine (BENTYL) 20 MG tablet Take 1 tablet by mouth 4 times daily for 10 days 24  Reece Maza MD       Current medications:    Current Facility-Administered Medications   Medication Dose Route Frequency Provider Last Rate Last Admin    sodium chloride flush 0.9 % injection 5-40 mL  5-40 mL IntraVENous 2 times per day Presley Cueva A, DO   10 mL at 25 0803    sodium chloride flush 0.9 % injection 5-40 mL  5-40 mL IntraVENous PRN Bengal, Presley A, DO        0.9 % sodium chloride infusion   IntraVENous PRN Bengal, Presley A, DO        potassium chloride (KLOR-CON M) extended release tablet 40 mEq  40 mEq Oral PRN Bengal, Presley A, DO        Or    potassium bicarb-citric acid (EFFER-K) effervescent tablet 40 mEq  40 mEq Oral PRN Bengal, Presley A, DO        Or    potassium chloride 10 mEq/100 mL IVPB (Peripheral Line)  10 mEq IntraVENous PRN Bengal, Presley A, DO        magnesium sulfate 2000 mg in 50 mL IVPB premix  2,000 mg IntraVENous PRN Bengal, Presley A, DO        [Held by provider] enoxaparin (LOVENOX) injection 40 mg  40 mg SubCUTAneous Daily Bengal,

## 2025-07-25 NOTE — PLAN OF CARE
Problem: Discharge Planning  Goal: Discharge to home or other facility with appropriate resources  Outcome: Progressing  Flowsheets (Taken 7/24/2025 2018)  Discharge to home or other facility with appropriate resources:   Identify barriers to discharge with patient and caregiver   Arrange for needed discharge resources and transportation as appropriate   Identify discharge learning needs (meds, wound care, etc)   Arrange for interpreters to assist at discharge as needed   Refer to discharge planning if patient needs post-hospital services based on physician order or complex needs related to functional status, cognitive ability or social support system     Problem: Pain  Goal: Verbalizes/displays adequate comfort level or baseline comfort level  7/24/2025 2318 by Salome Dumont, RN  Outcome: Progressing  7/24/2025 1410 by Salome Espinal, RN  Outcome: Progressing

## 2025-07-27 LAB
SEND OUT REPORT: NORMAL
TEST NAME: NORMAL

## 2025-07-28 LAB
SURGICAL PATHOLOGY REPORT: NORMAL
SURGICAL PATHOLOGY REPORT: NORMAL

## 2025-07-29 ENCOUNTER — TELEPHONE (OUTPATIENT)
Age: 50
End: 2025-07-29

## 2025-07-29 NOTE — TELEPHONE ENCOUNTER
Called and spoke with patient and inform patient that Dr. Douglass will call with results tomorrow. Pt verbalized understanding.

## 2025-07-30 NOTE — TELEPHONE ENCOUNTER
Patient called back again. I will make sure Dr. Douglass calls patient before clinic. Leaving in the basket to make sure it gets complete. Advised patient Dr. Douglass will be calling before clinic. She verbalized understanding.

## 2025-08-29 ENCOUNTER — HOSPITAL ENCOUNTER (OUTPATIENT)
Dept: GENERAL RADIOLOGY | Age: 50
Discharge: HOME | End: 2025-08-29
Payer: MEDICAID

## 2025-08-29 DIAGNOSIS — Z01.818 PREOP EXAMINATION: ICD-10-CM

## 2025-08-29 PROCEDURE — 71046 X-RAY EXAM CHEST 2 VIEWS: CPT

## 2025-08-30 LAB
ANION GAP SERPL CALCULATED.3IONS-SCNC: 12 MMOL/L (ref 3–16)
BASOPHILS # BLD: 0 K/UL (ref 0–0.2)
BASOPHILS NFR BLD: 0.3 %
BUN SERPL-MCNC: 13 MG/DL (ref 7–20)
CALCIUM SERPL-MCNC: 9.5 MG/DL (ref 8.3–10.6)
CHLORIDE SERPL-SCNC: 105 MMOL/L (ref 99–110)
CO2 SERPL-SCNC: 23 MMOL/L (ref 21–32)
CREAT SERPL-MCNC: 0.6 MG/DL (ref 0.6–1.1)
DEPRECATED RDW RBC AUTO: 13.8 % (ref 12.4–15.4)
EOSINOPHIL # BLD: 0.2 K/UL (ref 0–0.6)
EOSINOPHIL NFR BLD: 2.2 %
GFR SERPLBLD CREATININE-BSD FMLA CKD-EPI: >90 ML/MIN/{1.73_M2}
GLUCOSE SERPL-MCNC: 85 MG/DL (ref 70–99)
HCT VFR BLD AUTO: 43.4 % (ref 36–48)
HGB BLD-MCNC: 15.2 G/DL (ref 12–16)
LYMPHOCYTES # BLD: 1.7 K/UL (ref 1–5.1)
LYMPHOCYTES NFR BLD: 21.1 %
MCH RBC QN AUTO: 30.5 PG (ref 26–34)
MCHC RBC AUTO-ENTMCNC: 35 G/DL (ref 31–36)
MCV RBC AUTO: 87.1 FL (ref 80–100)
MONOCYTES # BLD: 0.5 K/UL (ref 0–1.3)
MONOCYTES NFR BLD: 6 %
NEUTROPHILS # BLD: 5.5 K/UL (ref 1.7–7.7)
NEUTROPHILS NFR BLD: 70.4 %
PLATELET # BLD AUTO: 213 K/UL (ref 135–450)
PMV BLD AUTO: 9.1 FL (ref 5–10.5)
POTASSIUM SERPL-SCNC: 4.6 MMOL/L (ref 3.5–5.1)
RBC # BLD AUTO: 4.98 M/UL (ref 4–5.2)
SODIUM SERPL-SCNC: 140 MMOL/L (ref 136–145)
WBC # BLD AUTO: 7.8 K/UL (ref 4–11)

## 2025-09-03 ENCOUNTER — ANESTHESIA EVENT (OUTPATIENT)
Dept: OPERATING ROOM | Age: 50
End: 2025-09-03
Payer: MEDICAID

## 2025-09-03 ENCOUNTER — HOSPITAL ENCOUNTER (OUTPATIENT)
Age: 50
Setting detail: OUTPATIENT SURGERY
Discharge: HOME | End: 2025-09-03
Attending: OBSTETRICS & GYNECOLOGY | Admitting: OBSTETRICS & GYNECOLOGY
Payer: MEDICAID

## 2025-09-03 ENCOUNTER — ANESTHESIA (OUTPATIENT)
Dept: OPERATING ROOM | Age: 50
End: 2025-09-03
Payer: MEDICAID

## 2025-09-03 VITALS
HEART RATE: 70 BPM | RESPIRATION RATE: 12 BRPM | WEIGHT: 182 LBS | DIASTOLIC BLOOD PRESSURE: 80 MMHG | SYSTOLIC BLOOD PRESSURE: 140 MMHG | BODY MASS INDEX: 33.49 KG/M2 | HEIGHT: 62 IN | OXYGEN SATURATION: 97 % | TEMPERATURE: 97.3 F

## 2025-09-03 DIAGNOSIS — C56.2 MALIGNANT NEOPLASM OF LEFT OVARY (HCC): ICD-10-CM

## 2025-09-03 LAB
ABO/RH: NORMAL
ANTIBODY SCREEN: NORMAL
HCG UR QL: NEGATIVE

## 2025-09-03 PROCEDURE — 2500000003 HC RX 250 WO HCPCS: Performed by: NURSE ANESTHETIST, CERTIFIED REGISTERED

## 2025-09-03 PROCEDURE — 88112 CYTOPATH CELL ENHANCE TECH: CPT

## 2025-09-03 PROCEDURE — 6360000002 HC RX W HCPCS: Performed by: NURSE ANESTHETIST, CERTIFIED REGISTERED

## 2025-09-03 PROCEDURE — 2580000003 HC RX 258: Performed by: ANESTHESIOLOGY

## 2025-09-03 PROCEDURE — 7100000001 HC PACU RECOVERY - ADDTL 15 MIN: Performed by: OBSTETRICS & GYNECOLOGY

## 2025-09-03 PROCEDURE — 86850 RBC ANTIBODY SCREEN: CPT

## 2025-09-03 PROCEDURE — 7100000010 HC PHASE II RECOVERY - FIRST 15 MIN: Performed by: OBSTETRICS & GYNECOLOGY

## 2025-09-03 PROCEDURE — 88309 TISSUE EXAM BY PATHOLOGIST: CPT

## 2025-09-03 PROCEDURE — 6360000002 HC RX W HCPCS: Performed by: OBSTETRICS & GYNECOLOGY

## 2025-09-03 PROCEDURE — 3600000009 HC SURGERY ROBOT BASE: Performed by: OBSTETRICS & GYNECOLOGY

## 2025-09-03 PROCEDURE — 6360000002 HC RX W HCPCS: Performed by: ANESTHESIOLOGY

## 2025-09-03 PROCEDURE — 84703 CHORIONIC GONADOTROPIN ASSAY: CPT

## 2025-09-03 PROCEDURE — 3700000001 HC ADD 15 MINUTES (ANESTHESIA): Performed by: OBSTETRICS & GYNECOLOGY

## 2025-09-03 PROCEDURE — 86901 BLOOD TYPING SEROLOGIC RH(D): CPT

## 2025-09-03 PROCEDURE — 3600000019 HC SURGERY ROBOT ADDTL 15MIN: Performed by: OBSTETRICS & GYNECOLOGY

## 2025-09-03 PROCEDURE — 2709999900 HC NON-CHARGEABLE SUPPLY: Performed by: OBSTETRICS & GYNECOLOGY

## 2025-09-03 PROCEDURE — 88305 TISSUE EXAM BY PATHOLOGIST: CPT

## 2025-09-03 PROCEDURE — 88307 TISSUE EXAM BY PATHOLOGIST: CPT

## 2025-09-03 PROCEDURE — 7100000011 HC PHASE II RECOVERY - ADDTL 15 MIN: Performed by: OBSTETRICS & GYNECOLOGY

## 2025-09-03 PROCEDURE — 7100000000 HC PACU RECOVERY - FIRST 15 MIN: Performed by: OBSTETRICS & GYNECOLOGY

## 2025-09-03 PROCEDURE — 64488 TAP BLOCK BI INJECTION: CPT | Performed by: ANESTHESIOLOGY

## 2025-09-03 PROCEDURE — 2500000003 HC RX 250 WO HCPCS: Performed by: OBSTETRICS & GYNECOLOGY

## 2025-09-03 PROCEDURE — 86900 BLOOD TYPING SEROLOGIC ABO: CPT

## 2025-09-03 PROCEDURE — S2900 ROBOTIC SURGICAL SYSTEM: HCPCS | Performed by: OBSTETRICS & GYNECOLOGY

## 2025-09-03 PROCEDURE — 3700000000 HC ANESTHESIA ATTENDED CARE: Performed by: OBSTETRICS & GYNECOLOGY

## 2025-09-03 RX ORDER — BUPIVACAINE HYDROCHLORIDE 2.5 MG/ML
INJECTION, SOLUTION EPIDURAL; INFILTRATION; INTRACAUDAL; PERINEURAL
Status: COMPLETED | OUTPATIENT
Start: 2025-09-03 | End: 2025-09-03

## 2025-09-03 RX ORDER — PHENYLEPHRINE HCL IN 0.9% NACL 1 MG/10 ML
SYRINGE (ML) INTRAVENOUS
Status: DISCONTINUED | OUTPATIENT
Start: 2025-09-03 | End: 2025-09-03 | Stop reason: SDUPTHER

## 2025-09-03 RX ORDER — HYDROMORPHONE HYDROCHLORIDE 1 MG/ML
0.5 INJECTION, SOLUTION INTRAMUSCULAR; INTRAVENOUS; SUBCUTANEOUS EVERY 5 MIN PRN
Status: DISCONTINUED | OUTPATIENT
Start: 2025-09-03 | End: 2025-09-03 | Stop reason: HOSPADM

## 2025-09-03 RX ORDER — LABETALOL HYDROCHLORIDE 5 MG/ML
10 INJECTION, SOLUTION INTRAVENOUS
Status: DISCONTINUED | OUTPATIENT
Start: 2025-09-03 | End: 2025-09-03 | Stop reason: HOSPADM

## 2025-09-03 RX ORDER — FENTANYL CITRATE 50 UG/ML
INJECTION, SOLUTION INTRAMUSCULAR; INTRAVENOUS
Status: DISCONTINUED | OUTPATIENT
Start: 2025-09-03 | End: 2025-09-03 | Stop reason: SDUPTHER

## 2025-09-03 RX ORDER — ONDANSETRON 2 MG/ML
INJECTION INTRAMUSCULAR; INTRAVENOUS
Status: DISCONTINUED | OUTPATIENT
Start: 2025-09-03 | End: 2025-09-03 | Stop reason: SDUPTHER

## 2025-09-03 RX ORDER — ONDANSETRON 4 MG/1
4 TABLET, FILM COATED ORAL EVERY 8 HOURS PRN
Qty: 20 TABLET | Refills: 0 | Status: SHIPPED | OUTPATIENT
Start: 2025-09-03

## 2025-09-03 RX ORDER — MEPERIDINE HYDROCHLORIDE 25 MG/ML
12.5 INJECTION INTRAMUSCULAR; INTRAVENOUS; SUBCUTANEOUS EVERY 5 MIN PRN
Status: DISCONTINUED | OUTPATIENT
Start: 2025-09-03 | End: 2025-09-03 | Stop reason: HOSPADM

## 2025-09-03 RX ORDER — DEXAMETHASONE SODIUM PHOSPHATE 4 MG/ML
INJECTION, SOLUTION INTRA-ARTICULAR; INTRALESIONAL; INTRAMUSCULAR; INTRAVENOUS; SOFT TISSUE
Status: DISCONTINUED | OUTPATIENT
Start: 2025-09-03 | End: 2025-09-03 | Stop reason: SDUPTHER

## 2025-09-03 RX ORDER — SODIUM CHLORIDE 0.9 % (FLUSH) 0.9 %
5-40 SYRINGE (ML) INJECTION EVERY 12 HOURS SCHEDULED
Status: DISCONTINUED | OUTPATIENT
Start: 2025-09-03 | End: 2025-09-03 | Stop reason: HOSPADM

## 2025-09-03 RX ORDER — SODIUM CHLORIDE, SODIUM LACTATE, POTASSIUM CHLORIDE, CALCIUM CHLORIDE 600; 310; 30; 20 MG/100ML; MG/100ML; MG/100ML; MG/100ML
INJECTION, SOLUTION INTRAVENOUS CONTINUOUS
Status: DISCONTINUED | OUTPATIENT
Start: 2025-09-03 | End: 2025-09-03 | Stop reason: HOSPADM

## 2025-09-03 RX ORDER — SODIUM CHLORIDE 0.9 % (FLUSH) 0.9 %
5-40 SYRINGE (ML) INJECTION PRN
Status: DISCONTINUED | OUTPATIENT
Start: 2025-09-03 | End: 2025-09-03 | Stop reason: HOSPADM

## 2025-09-03 RX ORDER — OXYCODONE HYDROCHLORIDE 5 MG/1
5 TABLET ORAL EVERY 12 HOURS PRN
Qty: 10 TABLET | Refills: 0 | Status: SHIPPED | OUTPATIENT
Start: 2025-09-03 | End: 2025-09-08

## 2025-09-03 RX ORDER — LIDOCAINE HYDROCHLORIDE 10 MG/ML
1 INJECTION, SOLUTION EPIDURAL; INFILTRATION; INTRACAUDAL; PERINEURAL
Status: DISCONTINUED | OUTPATIENT
Start: 2025-09-03 | End: 2025-09-03 | Stop reason: HOSPADM

## 2025-09-03 RX ORDER — IBUPROFEN 600 MG/1
600 TABLET, FILM COATED ORAL EVERY 6 HOURS PRN
Qty: 30 TABLET | Refills: 1 | Status: SHIPPED | OUTPATIENT
Start: 2025-09-03

## 2025-09-03 RX ORDER — MAGNESIUM HYDROXIDE 1200 MG/15ML
LIQUID ORAL CONTINUOUS PRN
Status: COMPLETED | OUTPATIENT
Start: 2025-09-03 | End: 2025-09-03

## 2025-09-03 RX ORDER — METOCLOPRAMIDE HYDROCHLORIDE 5 MG/ML
10 INJECTION INTRAMUSCULAR; INTRAVENOUS
Status: DISCONTINUED | OUTPATIENT
Start: 2025-09-03 | End: 2025-09-03 | Stop reason: HOSPADM

## 2025-09-03 RX ORDER — SODIUM CHLORIDE 9 MG/ML
INJECTION, SOLUTION INTRAVENOUS PRN
Status: DISCONTINUED | OUTPATIENT
Start: 2025-09-03 | End: 2025-09-03 | Stop reason: HOSPADM

## 2025-09-03 RX ORDER — MIDAZOLAM HYDROCHLORIDE 1 MG/ML
INJECTION, SOLUTION INTRAMUSCULAR; INTRAVENOUS
Status: DISCONTINUED | OUTPATIENT
Start: 2025-09-03 | End: 2025-09-03 | Stop reason: SDUPTHER

## 2025-09-03 RX ORDER — KETOROLAC TROMETHAMINE 30 MG/ML
INJECTION, SOLUTION INTRAMUSCULAR; INTRAVENOUS
Status: DISCONTINUED | OUTPATIENT
Start: 2025-09-03 | End: 2025-09-03 | Stop reason: SDUPTHER

## 2025-09-03 RX ORDER — ROCURONIUM BROMIDE 10 MG/ML
INJECTION, SOLUTION INTRAVENOUS
Status: DISCONTINUED | OUTPATIENT
Start: 2025-09-03 | End: 2025-09-03 | Stop reason: SDUPTHER

## 2025-09-03 RX ORDER — HYDROMORPHONE HYDROCHLORIDE 1 MG/ML
0.5 INJECTION, SOLUTION INTRAMUSCULAR; INTRAVENOUS; SUBCUTANEOUS
Status: DISCONTINUED | OUTPATIENT
Start: 2025-09-03 | End: 2025-09-03 | Stop reason: HOSPADM

## 2025-09-03 RX ORDER — DIPHENHYDRAMINE HYDROCHLORIDE 50 MG/ML
12.5 INJECTION, SOLUTION INTRAMUSCULAR; INTRAVENOUS
Status: DISCONTINUED | OUTPATIENT
Start: 2025-09-03 | End: 2025-09-03 | Stop reason: HOSPADM

## 2025-09-03 RX ORDER — METHOCARBAMOL 100 MG/ML
INJECTION, SOLUTION INTRAMUSCULAR; INTRAVENOUS
Status: DISCONTINUED | OUTPATIENT
Start: 2025-09-03 | End: 2025-09-03 | Stop reason: SDUPTHER

## 2025-09-03 RX ORDER — DOCUSATE SODIUM 100 MG/1
100 CAPSULE, LIQUID FILLED ORAL 2 TIMES DAILY
Qty: 60 CAPSULE | Refills: 1 | Status: SHIPPED | OUTPATIENT
Start: 2025-09-03

## 2025-09-03 RX ORDER — PROPOFOL 10 MG/ML
INJECTION, EMULSION INTRAVENOUS
Status: DISCONTINUED | OUTPATIENT
Start: 2025-09-03 | End: 2025-09-03 | Stop reason: SDUPTHER

## 2025-09-03 RX ORDER — HYDRALAZINE HYDROCHLORIDE 20 MG/ML
10 INJECTION INTRAMUSCULAR; INTRAVENOUS
Status: DISCONTINUED | OUTPATIENT
Start: 2025-09-03 | End: 2025-09-03 | Stop reason: HOSPADM

## 2025-09-03 RX ORDER — CIPROFLOXACIN 2 MG/ML
400 INJECTION, SOLUTION INTRAVENOUS ONCE
Status: COMPLETED | OUTPATIENT
Start: 2025-09-03 | End: 2025-09-03

## 2025-09-03 RX ORDER — METRONIDAZOLE 500 MG/100ML
500 INJECTION, SOLUTION INTRAVENOUS ONCE
Status: COMPLETED | OUTPATIENT
Start: 2025-09-03 | End: 2025-09-03

## 2025-09-03 RX ADMIN — SODIUM CHLORIDE, SODIUM LACTATE, POTASSIUM CHLORIDE, AND CALCIUM CHLORIDE: .6; .31; .03; .02 INJECTION, SOLUTION INTRAVENOUS at 12:05

## 2025-09-03 RX ADMIN — SODIUM CHLORIDE, SODIUM LACTATE, POTASSIUM CHLORIDE, AND CALCIUM CHLORIDE: .6; .31; .03; .02 INJECTION, SOLUTION INTRAVENOUS at 09:28

## 2025-09-03 RX ADMIN — HYDROMORPHONE HYDROCHLORIDE 0.5 MG: 1 INJECTION, SOLUTION INTRAMUSCULAR; INTRAVENOUS; SUBCUTANEOUS at 13:27

## 2025-09-03 RX ADMIN — Medication 200 MCG: at 10:31

## 2025-09-03 RX ADMIN — DEXAMETHASONE SODIUM PHOSPHATE 8 MG: 4 INJECTION INTRA-ARTICULAR; INTRALESIONAL; INTRAMUSCULAR; INTRAVENOUS; SOFT TISSUE at 10:20

## 2025-09-03 RX ADMIN — KETOROLAC TROMETHAMINE 30 MG: 30 INJECTION, SOLUTION INTRAMUSCULAR at 12:35

## 2025-09-03 RX ADMIN — ROCURONIUM BROMIDE 20 MG: 10 INJECTION, SOLUTION INTRAVENOUS at 11:02

## 2025-09-03 RX ADMIN — Medication 200 MCG: at 10:39

## 2025-09-03 RX ADMIN — METRONIDAZOLE 500 MG: 500 INJECTION, SOLUTION INTRAVENOUS at 10:15

## 2025-09-03 RX ADMIN — Medication 200 MCG: at 10:20

## 2025-09-03 RX ADMIN — PROPOFOL 120 MG: 10 INJECTION, EMULSION INTRAVENOUS at 10:10

## 2025-09-03 RX ADMIN — BUPIVACAINE 10 ML: 13.3 INJECTION, SUSPENSION, LIPOSOMAL INFILTRATION at 10:16

## 2025-09-03 RX ADMIN — METHOCARBAMOL 1000 MG: 100 INJECTION, SOLUTION INTRAMUSCULAR; INTRAVENOUS at 10:55

## 2025-09-03 RX ADMIN — CIPROFLOXACIN 400 MG: 2 INJECTION, SOLUTION INTRAVENOUS at 10:12

## 2025-09-03 RX ADMIN — BUPIVACAINE HYDROCHLORIDE 15 ML: 2.5 INJECTION, SOLUTION EPIDURAL; INFILTRATION; INTRACAUDAL; PERINEURAL at 10:16

## 2025-09-03 RX ADMIN — SUGAMMADEX 200 MG: 100 INJECTION, SOLUTION INTRAVENOUS at 12:39

## 2025-09-03 RX ADMIN — ROCURONIUM BROMIDE 50 MG: 10 INJECTION, SOLUTION INTRAVENOUS at 10:10

## 2025-09-03 RX ADMIN — HYDROMORPHONE HYDROCHLORIDE 1 MG: 1 INJECTION, SOLUTION INTRAMUSCULAR; INTRAVENOUS; SUBCUTANEOUS at 12:03

## 2025-09-03 RX ADMIN — BUPIVACAINE HYDROCHLORIDE 15 ML: 2.5 INJECTION, SOLUTION EPIDURAL; INFILTRATION; INTRACAUDAL; PERINEURAL at 10:18

## 2025-09-03 RX ADMIN — ROCURONIUM BROMIDE 20 MG: 10 INJECTION, SOLUTION INTRAVENOUS at 11:55

## 2025-09-03 RX ADMIN — MIDAZOLAM HYDROCHLORIDE 2 MG: 1 INJECTION, SOLUTION INTRAMUSCULAR; INTRAVENOUS at 10:06

## 2025-09-03 RX ADMIN — ONDANSETRON 4 MG: 2 INJECTION, SOLUTION INTRAMUSCULAR; INTRAVENOUS at 10:20

## 2025-09-03 RX ADMIN — FENTANYL CITRATE 100 MCG: 50 INJECTION INTRAMUSCULAR; INTRAVENOUS at 10:06

## 2025-09-03 RX ADMIN — HYDROMORPHONE HYDROCHLORIDE 0.5 MG: 1 INJECTION, SOLUTION INTRAMUSCULAR; INTRAVENOUS; SUBCUTANEOUS at 13:16

## 2025-09-03 RX ADMIN — BUPIVACAINE 10 ML: 13.3 INJECTION, SUSPENSION, LIPOSOMAL INFILTRATION at 10:18

## 2025-09-03 ASSESSMENT — PAIN - FUNCTIONAL ASSESSMENT
PAIN_FUNCTIONAL_ASSESSMENT: 0-10
PAIN_FUNCTIONAL_ASSESSMENT: PREVENTS OR INTERFERES SOME ACTIVE ACTIVITIES AND ADLS
PAIN_FUNCTIONAL_ASSESSMENT: 0-10
PAIN_FUNCTIONAL_ASSESSMENT: 0-10

## 2025-09-03 ASSESSMENT — PAIN DESCRIPTION - DESCRIPTORS
DESCRIPTORS: SORE
DESCRIPTORS: ACHING;DISCOMFORT;SORE
DESCRIPTORS: ACHING;PRESSURE;TENDER;SORE

## 2025-09-03 ASSESSMENT — PAIN DESCRIPTION - PAIN TYPE
TYPE: SURGICAL PAIN
TYPE: SURGICAL PAIN

## 2025-09-03 ASSESSMENT — PAIN SCALES - GENERAL
PAINLEVEL_OUTOF10: 0
PAINLEVEL_OUTOF10: 5
PAINLEVEL_OUTOF10: 7
PAINLEVEL_OUTOF10: 7

## 2025-09-03 ASSESSMENT — PAIN DESCRIPTION - LOCATION
LOCATION: ABDOMEN
LOCATION: ABDOMEN

## 2025-09-03 ASSESSMENT — PAIN DESCRIPTION - FREQUENCY
FREQUENCY: INTERMITTENT
FREQUENCY: CONTINUOUS

## 2025-09-03 ASSESSMENT — PAIN DESCRIPTION - ORIENTATION
ORIENTATION: ANTERIOR;LOWER
ORIENTATION: ANTERIOR;LOWER

## 2025-09-03 ASSESSMENT — PAIN DESCRIPTION - ONSET: ONSET: AWAKENED FROM SLEEP

## (undated) DEVICE — SINGLE USE AIR/WATER, SUCTION AND BIOPSY VALVES SET: Brand: ORCAPOD™

## (undated) DEVICE — FORCEPS BX PED L160CM JAW DIA1.8MM WRK CHN 2MM W/ NDL DISP

## (undated) DEVICE — COVER TBL W79XL90IN HVY DUTY REINF FAN FLD DISPOSABLE

## (undated) DEVICE — SYRINGE MED 10ML SLIP TIP BLNT FILL AND LUERLOCK DISP

## (undated) DEVICE — TOWEL MSG G N WVN STP FLAG

## (undated) DEVICE — GLOVE ORANGE PI 7   MSG9070

## (undated) DEVICE — PROTECTOR EYE AD FOAM RIG IGUARD

## (undated) DEVICE — DRAPE ARM W21XH19XL10.5IN DA VINCI XI INTUITIVE SURGICAL

## (undated) DEVICE — APPLICATOR MEDICATED 26 CC SOLUTION HI LT ORNG CHLORAPREP

## (undated) DEVICE — BULB SYRINGE & TIP PROTECTOR: Brand: LSL HEALTHCARE

## (undated) DEVICE — CONMED SCOPE SAVER BITE BLOCK, 20X27 MM: Brand: SCOPE SAVER

## (undated) DEVICE — AIR/WATER CLEANING ADAPTER FOR OLYMPUS® GI ENDOSCOPE: Brand: BULLDOG®

## (undated) DEVICE — SCISSORS 5MM X 45CM DISP

## (undated) DEVICE — SPONGE LAP W18XL18IN LOOP 7IN RADPQ PREWASHED DELINTED XRAY DETECTABLE 5 PER PACK

## (undated) DEVICE — SNARE COLD DIAMOND 10MM THIN

## (undated) DEVICE — SYSTEM RETRV 5MM PRT UNIV INZII

## (undated) DEVICE — GARMENT COMPR M FOR 12-18IN CALF INTMIT SGL BLDR HEMO-FORCE

## (undated) DEVICE — SUTURE PDS + SZ 0 L27IN ABSRB VLT L26MM CT 2 1 2 CIR PDP334H

## (undated) DEVICE — NEEDLE INSUF L150MM OD13GA DISP VERES

## (undated) DEVICE — GOWN SURG XL L47IN BLU POLY REINF BRTH FLM SL HK LOOP CLSR

## (undated) DEVICE — BRUSH CLN L220CM DIA5MM ZAH DISP FOR WRK CHAN DIA2.8/4.2MM

## (undated) DEVICE — DRIVER NDL DIA8MM MEGA SUTURECUT ENDOWRIST

## (undated) DEVICE — SUTURE MONOCRYL + SZ 4-0 L18IN ABSRB UD L19MM PS-2 3/8 CIR MCP496G

## (undated) DEVICE — GLOVE SURG SZ 65 CRM LTX FREE POLYISOPRENE POLYMER BEAD ANTI

## (undated) DEVICE — Device

## (undated) DEVICE — FORCEPS SURG DIA8MM PROGRASP ENDOSWITCH

## (undated) DEVICE — SKIN SCRUB TRAY PREM WET VLY

## (undated) DEVICE — PUMP SUC IRR TBNG L10FT W/ HNDPC ASSEMB STRYKEFLOW 2

## (undated) DEVICE — DRAPE UNDERBUTTOCK W33.5XL46IN STD N FEN REINF W/ FLD PCH

## (undated) DEVICE — COVER TIP DIA8MM DA VINCI SI XI X ENDOWRIST

## (undated) DEVICE — LINER INCONT W7XL14IN PEACH POLYMER FLUF ADH WT CNTOUR DLX

## (undated) DEVICE — DRAPE SURG IOBAN W17XL23IN FAB ANTIMIC GEN INCIS LNR FULL W HNDL

## (undated) DEVICE — DRAPE INCIS W23XL23IN FAB ANTIMIC IOBAN 2

## (undated) DEVICE — ENDOSCOPIC KIT 1.1+ 6 FT 2 GWN AAMI LEVEL 3

## (undated) DEVICE — SPONGE LAP REG 18X18IN

## (undated) DEVICE — OBTURATOR ROBOTIC DIA8MM STD OPT BLDELSS

## (undated) DEVICE — CUP CERV M 34MM W/ GUID RIDGE CRV SHFT GRAD MRK DST DSGN

## (undated) DEVICE — TRAP FLUID

## (undated) DEVICE — SYSTEM SMK EVAC LAP TBNG FILTER HSNG BENT STYL PNK SEE CLR

## (undated) DEVICE — TRAP SPEC COLL 40CC MUCOUS CALIB UNIV CONN FOR OPN SUCT

## (undated) DEVICE — ADHESIVE SKIN CLOSURE TOP 0.8 CC PREM PUR LIQUIBAND RAPID LF

## (undated) DEVICE — FORCEPS SURG DIA8MM BPLR FEN ENDOWRIST

## (undated) DEVICE — SCISSORS SURG DIA8MM MPLR CRV ENDOWRIST

## (undated) DEVICE — BW-412T DISP COMBO CLEANING BRUSH: Brand: SINGLE USE COMBINATION CLEANING BRUSH

## (undated) DEVICE — SOLUTION IRRIG 1000ML STRL H2O USP PLAS POUR BTL

## (undated) DEVICE — TRAP SPEC POLYP REM STRNR CLN DSGN MAGNIFYING WIND DISP

## (undated) DEVICE — GLOVE SURG SZ 8 L12IN FNGR THK79MIL GRN LTX FREE

## (undated) DEVICE — DRAPE SURG W31XL48IN POLYPR STD CUF L6IN SMS LEG PR SFT

## (undated) DEVICE — FOLEY TRAY 1 LAYR 16 FR 10 CC URIMTR SNAPSECURE URO175816S

## (undated) DEVICE — TOWEL SURG W17XL27IN BLU COT DLX PREWASHED DELINTED 8 PER PK

## (undated) DEVICE — TUBING, SUCTION, 1/4" X 12', STRAIGHT: Brand: MEDLINE

## (undated) DEVICE — GLOVE SURG SZ 7 L12IN FNGR THK79MIL GRN LTX FREE

## (undated) DEVICE — SOLUTION ANTIFOG VIS SYS CLEARIFY LAPSCP

## (undated) DEVICE — SEAL UNIVERSAL 5-12MM